# Patient Record
Sex: FEMALE | Race: WHITE | Employment: OTHER | URBAN - METROPOLITAN AREA
[De-identification: names, ages, dates, MRNs, and addresses within clinical notes are randomized per-mention and may not be internally consistent; named-entity substitution may affect disease eponyms.]

---

## 2017-10-29 ENCOUNTER — ED HISTORICAL/CONVERTED ENCOUNTER (OUTPATIENT)
Dept: OTHER | Age: 42
End: 2017-10-29

## 2018-02-03 ENCOUNTER — ED HISTORICAL/CONVERTED ENCOUNTER (OUTPATIENT)
Dept: OTHER | Age: 43
End: 2018-02-03

## 2018-12-15 ENCOUNTER — ED HISTORICAL/CONVERTED ENCOUNTER (OUTPATIENT)
Dept: OTHER | Age: 43
End: 2018-12-15

## 2019-05-18 ENCOUNTER — ED HISTORICAL/CONVERTED ENCOUNTER (OUTPATIENT)
Dept: OTHER | Age: 44
End: 2019-05-18

## 2019-05-23 ENCOUNTER — ED HISTORICAL/CONVERTED ENCOUNTER (OUTPATIENT)
Dept: OTHER | Age: 44
End: 2019-05-23

## 2020-01-24 ENCOUNTER — ED HISTORICAL/CONVERTED ENCOUNTER (OUTPATIENT)
Dept: OTHER | Age: 45
End: 2020-01-24

## 2020-10-21 ENCOUNTER — APPOINTMENT (OUTPATIENT)
Dept: GENERAL RADIOLOGY | Age: 45
End: 2020-10-21
Attending: EMERGENCY MEDICINE
Payer: MEDICARE

## 2020-10-21 ENCOUNTER — HOSPITAL ENCOUNTER (EMERGENCY)
Age: 45
Discharge: HOME | End: 2020-10-21
Attending: EMERGENCY MEDICINE
Payer: MEDICARE

## 2020-10-21 VITALS
WEIGHT: 265 LBS | SYSTOLIC BLOOD PRESSURE: 124 MMHG | TEMPERATURE: 98.2 F | HEART RATE: 107 BPM | DIASTOLIC BLOOD PRESSURE: 71 MMHG | RESPIRATION RATE: 20 BRPM | BODY MASS INDEX: 48.76 KG/M2 | OXYGEN SATURATION: 97 % | HEIGHT: 62 IN

## 2020-10-21 DIAGNOSIS — J04.0 LARYNGITIS: Primary | ICD-10-CM

## 2020-10-21 DIAGNOSIS — J20.9 ACUTE BRONCHITIS, UNSPECIFIED ORGANISM: ICD-10-CM

## 2020-10-21 PROCEDURE — 71045 X-RAY EXAM CHEST 1 VIEW: CPT

## 2020-10-21 PROCEDURE — 99283 EMERGENCY DEPT VISIT LOW MDM: CPT

## 2020-10-21 PROCEDURE — 74011636637 HC RX REV CODE- 636/637: Performed by: EMERGENCY MEDICINE

## 2020-10-21 RX ORDER — BUPROPION HYDROCHLORIDE 100 MG/1
100 TABLET ORAL 2 TIMES DAILY
COMMUNITY

## 2020-10-21 RX ORDER — INSULIN ASPART 100 [IU]/ML
8 INJECTION, SOLUTION INTRAVENOUS; SUBCUTANEOUS 2 TIMES DAILY
COMMUNITY
End: 2021-08-01

## 2020-10-21 RX ORDER — PREGABALIN 100 MG/1
100 CAPSULE ORAL 2 TIMES DAILY
COMMUNITY
End: 2021-11-23

## 2020-10-21 RX ORDER — AZITHROMYCIN 250 MG/1
TABLET, FILM COATED ORAL
Qty: 6 TAB | Refills: 0 | Status: SHIPPED | OUTPATIENT
Start: 2020-10-21 | End: 2021-05-27

## 2020-10-21 RX ORDER — PREDNISONE 20 MG/1
20 TABLET ORAL DAILY
Qty: 12 TAB | Refills: 0 | Status: SHIPPED | OUTPATIENT
Start: 2020-10-21 | End: 2020-10-27

## 2020-10-21 RX ORDER — PREDNISONE 20 MG/1
60 TABLET ORAL ONCE
Status: COMPLETED | OUTPATIENT
Start: 2020-10-21 | End: 2020-10-21

## 2020-10-21 RX ORDER — DEXLANSOPRAZOLE 30 MG/1
30 CAPSULE, DELAYED RELEASE ORAL DAILY
COMMUNITY

## 2020-10-21 RX ORDER — SPIRONOLACTONE 100 MG/1
100 TABLET, FILM COATED ORAL DAILY
COMMUNITY

## 2020-10-21 RX ORDER — INSULIN GLARGINE 100 [IU]/ML
40 INJECTION, SOLUTION SUBCUTANEOUS 2 TIMES DAILY
COMMUNITY
End: 2021-08-01

## 2020-10-21 RX ORDER — ROSUVASTATIN CALCIUM 40 MG/1
40 TABLET, COATED ORAL
COMMUNITY

## 2020-10-21 RX ORDER — LEVOTHYROXINE SODIUM 150 UG/1
150 TABLET ORAL
COMMUNITY
End: 2022-09-27 | Stop reason: SDUPTHER

## 2020-10-21 RX ADMIN — PREDNISONE 60 MG: 20 TABLET ORAL at 21:30

## 2020-10-22 NOTE — ED PROVIDER NOTES
EMERGENCY DEPARTMENT HISTORY AND PHYSICAL EXAM      Date: 10/21/2020  Patient Name: Kashmir Hancock    History of Presenting Illness     Chief Complaint   Patient presents with    Laryngitis    Cough       History Provided By: Patient    HPI: Kashmir Hancock, 50 y.o. female   presents to the ED with cc of hoarse voice. Patient complains of hoarse voice for last 3 days and intermittent nonproductive cough that started today. Patient has history of heavy smoking. No fever chills. No shortness of breath. No headache. No obvious exposure to COVID-19. PCP: Fior Najera MD    No current facility-administered medications on file prior to encounter. Current Outpatient Medications on File Prior to Encounter   Medication Sig Dispense Refill    buPROPion (WELLBUTRIN) 100 mg tablet Take 100 mg by mouth two (2) times a day.  levothyroxine (Synthroid) 150 mcg tablet Take 150 mcg by mouth Daily (before breakfast).  pregabalin (Lyrica) 100 mg capsule Take 100 mg by mouth two (2) times a day.  rosuvastatin (CRESTOR) 40 mg tablet Take 40 mg by mouth nightly.  spironolactone (ALDACTONE) 100 mg tablet Take 100 mg by mouth daily.  dexlansoprazole (Dexilant) 30 mg capsule Take 30 mg by mouth daily.  cariprazine (Vraylar) 1.5 mg capsule Take 1.5 mg by mouth daily.  insulin glargine (Lantus Solostar U-100 Insulin) 100 unit/mL (3 mL) inpn 40 Units by SubCUTAneous route two (2) times a day.  linagliptin (TRADJENTA PO) Take  by mouth.  insulin aspart U-100 (NovoLOG Flexpen U-100 Insulin) 100 unit/mL (3 mL) inpn 8 Units by SubCUTAneous route two (2) times a day.          Past History     Past Medical History:  Past Medical History:   Diagnosis Date    Asthma     Bipolar 1 disorder ([facility]     Carpal tunnel syndrome, bilateral     Chronic obstructive pulmonary disease (HCC)     Diabetes ([facility] [address]     Hypertension     Hypothyroidism        Past Surgical History:  Past Surgical History:   Procedure Laterality Date    HX CHOLECYSTECTOMY      HX OTHER SURGICAL      Uterine Ablation    HX TONSILLECTOMY      HX TUBAL LIGATION         Family History:  History reviewed. No pertinent family history. Social History:  Social History     Tobacco Use    Smoking status: Current Every Day Smoker     Packs/day: 0.25    Smokeless tobacco: Never Used   Substance Use Topics    Alcohol use: Not Currently    Drug use: Not Currently       Allergies: Allergies   Allergen Reactions    Latex Rash    Adhesive Rash    Aspirin Hives    Penicillins Hives         Review of Systems   Review of Systems   Constitutional: Negative for chills and fever. Respiratory: Negative for shortness of breath. Cardiovascular: Negative for chest pain. Gastrointestinal: Negative for nausea. Endocrine: Negative for polyuria. Genitourinary: Negative for dysuria. Skin: Negative for rash. Neurological: Negative for headaches. All other systems reviewed and are negative. Physical Exam   Physical Exam  Vitals signs and nursing note reviewed. Constitutional:       Appearance: Normal appearance. HENT:      Head: Normocephalic and atraumatic. Nose: Nose normal.      Mouth/Throat:      Mouth: Mucous membranes are moist.      Pharynx: Oropharynx is clear. Comments: Voice is hoarse without stridor. Eyes:      Conjunctiva/sclera: Conjunctivae normal.   Neck:      Musculoskeletal: Neck supple. Cardiovascular:      Rate and Rhythm: Normal rate and regular rhythm. Heart sounds: Normal heart sounds. Pulmonary:      Effort: Pulmonary effort is normal.      Breath sounds: Normal breath sounds. Comments: Few scattered rhonchi on forced exhalation. Abdominal:      General: Abdomen is flat. Bowel sounds are normal.      Palpations: Abdomen is soft. Musculoskeletal:      Right lower leg: No edema. Left lower leg: No edema. Skin:     General: Skin is warm and dry. Neurological:      General: No focal deficit present. Mental Status: She is alert and oriented to person, place, and time. Psychiatric:         Mood and Affect: Mood normal.         Diagnostic Study Results     Labs -   No results found for this or any previous visit (from the past 12 hour(s)). Radiologic Studies -   XR CHEST PORT    (Results Pending)     CT Results  (Last 48 hours)    None        CXR Results  (Last 48 hours)    None            Medical Decision Making   I am the first provider for this patient. I reviewed the vital signs, available nursing notes, past medical history, past surgical history, family history and social history. Vital Signs-Reviewed the patient's vital signs. Patient Vitals for the past 12 hrs:   Temp Pulse Resp BP SpO2   10/21/20 2058 98.2 °F (36.8 °C) (!) 107 20 124/71 97 %       Records Reviewed:     Provider Notes (Medical Decision Making):       ED Course:   Initial assessment performed. The patients presenting problems have been discussed, and they are in agreement with the care plan formulated and outlined with them. I have encouraged them to ask questions as they arise throughout their visit. PROCEDURES        PLAN:  1. Current Discharge Medication List      START taking these medications    Details   predniSONE (DELTASONE) 20 mg tablet Take 20 mg by mouth daily for 6 days. 3 tablets for 2 days then 2 tablets for 2 days then 1 tablet for 2 day with breakfast  Qty: 12 Tab, Refills: 0      azithromycin (Zithromax Z-Rufino) 250 mg tablet As instructed in the pack  Qty: 6 Tab, Refills: 0           2. Follow-up Information     Follow up With Specialties Details Why Contact Info    Follow up with your primary care physician  Schedule an appointment as soon as possible for a visit in 3 days As needed         Return to ED if worse     Diagnosis     Clinical Impression:   1. Laryngitis    2.  Acute bronchitis, unspecified organism

## 2020-10-22 NOTE — ED TRIAGE NOTES
Laryngitis x 2.5 weeks, now having \"choking/coughing spells\" ooset today where she becomes dizzy and near syncope during coughing

## 2020-12-25 ENCOUNTER — HOSPITAL ENCOUNTER (EMERGENCY)
Age: 45
Discharge: HOME | End: 2020-12-25
Attending: FAMILY MEDICINE
Payer: MEDICARE

## 2020-12-25 VITALS
SYSTOLIC BLOOD PRESSURE: 144 MMHG | OXYGEN SATURATION: 20 % | TEMPERATURE: 98.3 F | HEART RATE: 91 BPM | DIASTOLIC BLOOD PRESSURE: 74 MMHG | WEIGHT: 283 LBS | RESPIRATION RATE: 16 BRPM | HEIGHT: 62 IN | BODY MASS INDEX: 52.08 KG/M2

## 2020-12-25 DIAGNOSIS — R42 DIZZINESS: Primary | ICD-10-CM

## 2020-12-25 DIAGNOSIS — R73.9 HYPERGLYCEMIA: ICD-10-CM

## 2020-12-25 LAB
ALBUMIN SERPL-MCNC: 3.1 G/DL (ref 3.5–5)
ALBUMIN/GLOB SERPL: 0.9 {RATIO} (ref 1.1–2.2)
ALP SERPL-CCNC: 119 U/L (ref 45–117)
ALT SERPL-CCNC: 23 U/L (ref 12–78)
ANION GAP SERPL CALC-SCNC: 9 MMOL/L (ref 5–15)
AST SERPL W P-5'-P-CCNC: 8 U/L (ref 15–37)
BASOPHILS # BLD: 0 K/UL (ref 0–0.1)
BASOPHILS NFR BLD: 0 % (ref 0–1)
BILIRUB SERPL-MCNC: 0.3 MG/DL (ref 0.2–1)
BUN SERPL-MCNC: 14 MG/DL (ref 6–20)
BUN/CREAT SERPL: 17 (ref 12–20)
CA-I BLD-MCNC: 8.3 MG/DL (ref 8.5–10.1)
CHLORIDE SERPL-SCNC: 108 MMOL/L (ref 97–108)
CO2 SERPL-SCNC: 26 MMOL/L (ref 21–32)
CREAT SERPL-MCNC: 0.84 MG/DL (ref 0.55–1.02)
DIFFERENTIAL METHOD BLD: NORMAL
EOSINOPHIL # BLD: 0.1 K/UL (ref 0–0.4)
EOSINOPHIL NFR BLD: 1 % (ref 0–7)
ERYTHROCYTE [DISTWIDTH] IN BLOOD BY AUTOMATED COUNT: 13 % (ref 11.5–14.5)
GLOBULIN SER CALC-MCNC: 3.5 G/DL (ref 2–4)
GLUCOSE BLD STRIP.AUTO-MCNC: 283 MG/DL (ref 65–100)
GLUCOSE SERPL-MCNC: 262 MG/DL (ref 65–100)
HCT VFR BLD AUTO: 41.9 % (ref 35–47)
HGB BLD-MCNC: 13.8 G/DL (ref 11.5–16)
IMM GRANULOCYTES # BLD AUTO: 0 K/UL (ref 0–0.04)
IMM GRANULOCYTES NFR BLD AUTO: 0 % (ref 0–0.5)
LYMPHOCYTES # BLD: 3.3 K/UL (ref 0.8–3.5)
LYMPHOCYTES NFR BLD: 31 % (ref 12–49)
MCH RBC QN AUTO: 31.4 PG (ref 26–34)
MCHC RBC AUTO-ENTMCNC: 32.9 G/DL (ref 30–36.5)
MCV RBC AUTO: 95.4 FL (ref 80–99)
MONOCYTES # BLD: 0.6 K/UL (ref 0–1)
MONOCYTES NFR BLD: 5 % (ref 5–13)
NEUTS SEG # BLD: 6.8 K/UL (ref 1.8–8)
NEUTS SEG NFR BLD: 63 % (ref 32–75)
PERFORMED BY, TECHID: ABNORMAL
PLATELET # BLD AUTO: 243 K/UL (ref 150–400)
PMV BLD AUTO: 9.6 FL (ref 8.9–12.9)
POTASSIUM SERPL-SCNC: 3.7 MMOL/L (ref 3.5–5.1)
PROT SERPL-MCNC: 6.6 G/DL (ref 6.4–8.2)
RBC # BLD AUTO: 4.39 M/UL (ref 3.8–5.2)
SODIUM SERPL-SCNC: 143 MMOL/L (ref 136–145)
WBC # BLD AUTO: 10.9 K/UL (ref 3.6–11)

## 2020-12-25 PROCEDURE — 80053 COMPREHEN METABOLIC PANEL: CPT

## 2020-12-25 PROCEDURE — 99284 EMERGENCY DEPT VISIT MOD MDM: CPT

## 2020-12-25 PROCEDURE — 36415 COLL VENOUS BLD VENIPUNCTURE: CPT

## 2020-12-25 PROCEDURE — 85025 COMPLETE CBC W/AUTO DIFF WBC: CPT

## 2020-12-25 PROCEDURE — 82962 GLUCOSE BLOOD TEST: CPT

## 2020-12-26 NOTE — ED PROVIDER NOTES
[facility] EMERGENCY CARE CENTER    HISTORY AND PHYSICAL EXAM      Date: 12/25/2020  Patient Name: Elizabet Condon  YOB: 1975  MRN: [id number]  Room:  [place] [address]    History of Presenting Illness     Chief Complaint:  Dizziness     History Provided By: Patient  HPI/KATIE Limits: None    HPI: Magen Teague is a 50 y.o. female presenting to the ED with CC of dizziness with initial onset earlier today. Patient states that today she had 3 episodes of transient dizziness not lasting more than 2 minutes at a time. Patient unaware of what the triggers were. Patient states these episodes occur 10-12 times per month and has been occurring for the last 9 years. Patient states that she has not talked to any of her previous PCPs about this problem. Patient is also concerned about hyperglycemia secondary to steroid use. Patient states that earlier her glucose was 360 and she has excessive thirst.  Currently, patient denies F/C, headache, visual changes, N/V/D/C, abdominal pain, or  complaints. There are no other complaints, changes, or physical findings at this time. PCP: Shelby Georges MD    No current facility-administered medications on file prior to encounter. Current Outpatient Medications on File Prior to Encounter   Medication Sig Dispense Refill    buPROPion (WELLBUTRIN) 100 mg tablet Take 100 mg by mouth two (2) times a day.  levothyroxine (Synthroid) 150 mcg tablet Take 150 mcg by mouth Daily (before breakfast).  pregabalin (Lyrica) 100 mg capsule Take 100 mg by mouth two (2) times a day.  rosuvastatin (CRESTOR) 40 mg tablet Take 40 mg by mouth nightly.  spironolactone (ALDACTONE) 100 mg tablet Take 100 mg by mouth daily.  dexlansoprazole (Dexilant) 30 mg capsule Take 30 mg by mouth daily.  cariprazine (Vraylar) 1.5 mg capsule Take 1.5 mg by mouth daily.       insulin glargine (Lantus Solostar U-100 Insulin) 100 unit/mL (3 mL) inpn 40 Units by SubCUTAneous route two (2) times a day.  linagliptin (TRADJENTA PO) Take  by mouth.  insulin aspart U-100 (NovoLOG Flexpen U-100 Insulin) 100 unit/mL (3 mL) inpn 8 Units by SubCUTAneous route two (2) times a day.  azithromycin (Zithromax Z-Rufino) 250 mg tablet As instructed in the pack 6 Tab 0       Past History     PAST MEDICAL  The patient  has a past medical history of Asthma, Bipolar 1 disorder ([address] Carpal tunnel syndrome, bilateral, Chronic obstructive pulmonary disease ([address] Diabetes ([address] Hypertension, and Hypothyroidism. PAST SURGICAL  The patient  has a past surgical history that includes hx tonsillectomy; hx tubal ligation; hx cholecystectomy; and hx other surgical.    SOCIAL HISTORY  The patient  reports that she has been smoking. She has been smoking about 0.25 packs per day. She has never used smokeless tobacco. She reports previous alcohol use. She reports previous drug use. Allergies: The patient is allergic to latex; adhesive; aspirin; and penicillins. Review of Systems     Review of Systems   Constitutional: Negative. HENT: Negative. Eyes: Negative. Negative for visual disturbance. Respiratory: Negative for shortness of breath. Cardiovascular: Negative for chest pain. Gastrointestinal: Negative for abdominal pain. Endocrine: Negative. Genitourinary: Negative for dysuria, flank pain, frequency, urgency, vaginal bleeding and vaginal discharge. Musculoskeletal: Negative for back pain and neck pain. Skin: Negative. Allergic/Immunologic: Negative. Neurological: Positive for dizziness. Hematological: Negative. Psychiatric/Behavioral: Negative. Physical Exam     Vital Signs-Reviewed the patient's vital signs. Patient Vitals for the past 24 hrs:   Temp Pulse Resp BP SpO2   12/25/20 2127 98.3 °F (36.8 °C) 91 16 (!) 144/74 (!) 20 %      Physical Exam  Vitals signs and nursing note reviewed.    Constitutional:       Appearance: Normal appearance. She is well-developed and well-groomed. She is morbidly obese. HENT:      Head: Normocephalic and atraumatic. Mouth/Throat:      Mouth: Mucous membranes are moist.      Pharynx: Oropharynx is clear. Eyes:      Extraocular Movements: Extraocular movements intact. Right eye: No nystagmus. Left eye: No nystagmus. Conjunctiva/sclera: Conjunctivae normal.      Pupils: Pupils are equal, round, and reactive to light. Neck:      Musculoskeletal: Normal range of motion and neck supple. Cardiovascular:      Rate and Rhythm: Normal rate and regular rhythm. Pulmonary:      Effort: Pulmonary effort is normal.      Breath sounds: Normal breath sounds. Abdominal:      General: Abdomen is flat. Palpations: Abdomen is soft. Musculoskeletal: Normal range of motion. Skin:     General: Skin is warm and dry. Neurological:      General: No focal deficit present. Mental Status: She is alert and oriented to person, place, and time. Cranial Nerves: Cranial nerves are intact. Motor: Motor function is intact. Coordination: Coordination is intact. Gait: Gait is intact.    Psychiatric:         Mood and Affect: Mood normal.         Behavior: Behavior normal.       Diagnostic Study Results     Labs -     Recent Results (from the past 12 hour(s))   GLUCOSE, POC    Collection Time: 12/25/20  9:32 PM   Result Value Ref Range    Glucose (POC) 283 (H) 65 - 100 mg/dL    Performed by Jason Stoner    CBC WITH AUTOMATED DIFF    Collection Time: 12/25/20 10:00 PM   Result Value Ref Range    WBC 10.9 3.6 - 11.0 K/uL    RBC 4.39 3.80 - 5.20 M/uL    HGB 13.8 11.5 - 16.0 g/dL    HCT 41.9 35.0 - 47.0 %    MCV 95.4 80.0 - 99.0 FL    MCH 31.4 26.0 - 34.0 PG    MCHC 32.9 30.0 - 36.5 g/dL    RDW 13.0 11.5 - 14.5 %    PLATELET 317 741 - 499 K/uL    MPV 9.6 8.9 - 12.9 FL    NEUTROPHILS 63 32 - 75 %    LYMPHOCYTES 31 12 - 49 %    MONOCYTES 5 5 - 13 %    EOSINOPHILS 1 0 - 7 %    BASOPHILS 0 0 - 1 %    IMMATURE GRANULOCYTES 0 0.0 - 0.5 %    ABS. NEUTROPHILS 6.8 1.8 - 8.0 K/UL    ABS. LYMPHOCYTES 3.3 0.8 - 3.5 K/UL    ABS. MONOCYTES 0.6 0.0 - 1.0 K/UL    ABS. EOSINOPHILS 0.1 0.0 - 0.4 K/UL    ABS. BASOPHILS 0.0 0.0 - 0.1 K/UL    ABS. IMM. GRANS. 0.0 0.00 - 0.04 K/UL    DF AUTOMATED     METABOLIC PANEL, COMPREHENSIVE    Collection Time: 12/25/20 10:00 PM   Result Value Ref Range    Sodium 143 136 - 145 mmol/L    Potassium 3.7 3.5 - 5.1 mmol/L    Chloride 108 97 - 108 mmol/L    CO2 26 21 - 32 mmol/L    Anion gap 9 5 - 15 mmol/L    Glucose 262 (H) 65 - 100 mg/dL    BUN 14 6 - 20 mg/dL    Creatinine 0.84 0.55 - 1.02 mg/dL    BUN/Creatinine ratio 17 12 - 20      GFR est AA >60 >60 ml/min/1.73m2    GFR est non-AA >60 >60 ml/min/1.73m2    Calcium 8.3 (L) 8.5 - 10.1 mg/dL    Bilirubin, total 0.3 0.2 - 1.0 mg/dL    AST (SGOT) 8 (L) 15 - 37 U/L    ALT (SGPT) 23 12 - 78 U/L    Alk. phosphatase 119 (H) 45 - 117 U/L    Protein, total 6.6 6.4 - 8.2 g/dL    Albumin 3.1 (L) 3.5 - 5.0 g/dL    Globulin 3.5 2.0 - 4.0 g/dL    A-G Ratio 0.9 (L) 1.1 - 2.2       ECG interpretation: (Preliminary)  RHYTHM: normal sinus rhythm  RATE: regular . Rate (approx.): 81;   AXIS: normal  OH INTERVAL: normal  QRS INTERVAL: normal   ST/T WAVE: normal  EKG FINDINGS: normal EKG, normal sinus rhythm. Radiologic Studies -   No orders to display     CT Results  (Last 48 hours)    None        CXR Results  (Last 48 hours)    None        The results of the diagnostic studies, performed during the time frame I've seen and evaluated the patient, have been REVIEWED BY MYSELF. Procedures/Critical Care     PROCEDURES  None    Medical Decision Making   I am the first provider for this patient. I reviewed the vital signs, available nursing notes, past medical history, past surgical history, family history and social history. Records Reviewed: Nursing Notes    ED Course:   Initial assessment performed.  The patients presenting problems have been discussed, and they are in agreement with the care plan formulated and outlined with them. I have encouraged them to ask questions as they arise throughout their visit. Medications - No data to display    Provider Notes (Medical Decision Making): The patient presented to the emergency department with complaint of transient dizziness. There are no red flag symptoms such as diplopia, dysmetria, dysarthria, ataxia or unilateral numbness or weakness. Evaluation of the patient is significant for a nonfocal exam.  Diagnostic (laboratory/radiographic) evaluation is negative for acute findings except hyperglycemia. Symptoms are not suggestive dehydration, orthostatic hypotension, arrhythmia, electrolyte disturbance, ACS, BPPV, labyrinthitis, otitis media, medical toxicity or other serious etiology. These diagnoses have been considered and excluded clinically. Given the low risk of these diagnoses further testing and evaluation does not appear to be indicated at this time. If ordered, results of lab/radiology tests were reviewed and discussed with the patient and/or family. Diagnostic impression and plan were discussed and agreed upon with the patient and/or family. Patient advised to continue insulin sliding scale as dictated by PCP. The patient has received maximum benefit from this visit and felt eligible for discharge. Dizziness & hyperglycemia precautions were given. All questions were answered and concerns addressed. The patient was advised to follow-up with the patient's primary care physician, and that should the symptoms worsen or change in anyway that they are to return to the ER immediately for re-evaluation. Patient discharged in stable condition. Diagnosis/Plan/Follow Up     CLINICAL IMPRESSION:      ICD-10-CM ICD-9-CM   1. Dizziness  R42 780.4   2. Hyperglycemia  R73.9 790.29        DISPOSITION:  Discharged to Home or Self Care in stable condition. PLAN/FOLLOW UP  1.    Discharge Medication List as of 12/25/2020 11:16 PM        2. The patient's results have been reviewed with them. The patient has been counseled regarding their diagnosis. The patient verbally conveys understanding and agreement of the signs, symptoms, diagnosis, treatment and prognosis and additionally agrees to follow up as recommended with their PCP. The patient also agrees with the care-plan and conveys that all of their questions have been answered. I have also put together some discharge instructions for them that include: 1) educational information regarding their diagnosis, 2) how to care for their diagnosis at home, as well as a 3) list of reasons why they would want to return to the ED prior to their follow-up appointment, should their condition change. Follow-up Information     Follow up With Specialties Details Why Contact Info    Denton Curran MD Family Medicine Schedule an appointment as soon as possible for a visit in 3 days  [address] [address]      [address] [facility] Emergency Medicine Go to  If symptoms worsen [address]  [phone]        Return to ED if worse       PAWEL Rojas M.D.   [facility]  Emergency Department Physician

## 2021-05-27 ENCOUNTER — APPOINTMENT (OUTPATIENT)
Dept: CT IMAGING | Age: 46
End: 2021-05-27
Attending: EMERGENCY MEDICINE
Payer: MEDICARE

## 2021-05-27 ENCOUNTER — HOSPITAL ENCOUNTER (EMERGENCY)
Age: 46
Discharge: HOME | End: 2021-05-28
Attending: EMERGENCY MEDICINE
Payer: MEDICARE

## 2021-05-27 VITALS
RESPIRATION RATE: 18 BRPM | DIASTOLIC BLOOD PRESSURE: 79 MMHG | TEMPERATURE: 98.4 F | HEART RATE: 94 BPM | WEIGHT: 285 LBS | BODY MASS INDEX: 52.44 KG/M2 | HEIGHT: 62 IN | OXYGEN SATURATION: 96 % | SYSTOLIC BLOOD PRESSURE: 121 MMHG

## 2021-05-27 DIAGNOSIS — N20.0 RENAL CALCULI: Primary | ICD-10-CM

## 2021-05-27 LAB
APPEARANCE UR: ABNORMAL
BACTERIA URNS QL MICRO: ABNORMAL /HPF
BASOPHILS # BLD: 0 K/UL (ref 0–0.1)
BASOPHILS NFR BLD: 0 % (ref 0–1)
BILIRUB UR QL: NEGATIVE
COLOR UR: YELLOW
DIFFERENTIAL METHOD BLD: ABNORMAL
EOSINOPHIL # BLD: 0.1 K/UL (ref 0–0.4)
EOSINOPHIL NFR BLD: 0 % (ref 0–7)
EPITH CASTS URNS QL MICRO: ABNORMAL /LPF
ERYTHROCYTE [DISTWIDTH] IN BLOOD BY AUTOMATED COUNT: 14.1 % (ref 11.5–14.5)
GLUCOSE UR STRIP.AUTO-MCNC: 100 MG/DL
HCG UR QL: NEGATIVE
HCT VFR BLD AUTO: 44.9 % (ref 35–47)
HGB BLD-MCNC: 14.8 G/DL (ref 11.5–16)
HGB UR QL STRIP: NEGATIVE
KETONES UR QL STRIP.AUTO: NEGATIVE MG/DL
LEUKOCYTE ESTERASE UR QL STRIP.AUTO: NEGATIVE
LYMPHOCYTES NFR BLD: 33 % (ref 12–49)
MCH RBC QN AUTO: 31 PG (ref 26–34)
MCHC RBC AUTO-ENTMCNC: 33 G/DL (ref 30–36.5)
MCV RBC AUTO: 94.1 FL (ref 80–99)
MONOCYTES NFR BLD: 2 % (ref 5–13)
NEUTS SEG NFR BLD: 65 % (ref 32–75)
NITRITE UR QL STRIP.AUTO: NEGATIVE
PH UR STRIP: 5 [PH] (ref 5–8)
PLATELET # BLD AUTO: 288 K/UL (ref 150–400)
PMV BLD AUTO: 9.3 FL (ref 8.9–12.9)
PROT UR STRIP-MCNC: NEGATIVE MG/DL
RBC # BLD AUTO: 4.77 M/UL (ref 3.8–5.2)
RBC #/AREA URNS HPF: ABNORMAL /HPF (ref 0–5)
SP GR UR REFRACTOMETRY: 1.02 (ref 1–1.03)
UROBILINOGEN UR QL STRIP.AUTO: 0.1 EU/DL (ref 0.2–1)
WBC # BLD AUTO: 13 K/UL (ref 3.6–11)
WBC URNS QL MICRO: ABNORMAL /HPF (ref 0–4)

## 2021-05-27 PROCEDURE — 99283 EMERGENCY DEPT VISIT LOW MDM: CPT

## 2021-05-27 PROCEDURE — 74176 CT ABD & PELVIS W/O CONTRAST: CPT

## 2021-05-27 PROCEDURE — 96374 THER/PROPH/DIAG INJ IV PUSH: CPT

## 2021-05-27 PROCEDURE — 81003 URINALYSIS AUTO W/O SCOPE: CPT

## 2021-05-27 PROCEDURE — 74011250636 HC RX REV CODE- 250/636: Performed by: EMERGENCY MEDICINE

## 2021-05-27 PROCEDURE — 36415 COLL VENOUS BLD VENIPUNCTURE: CPT

## 2021-05-27 PROCEDURE — 85025 COMPLETE CBC W/AUTO DIFF WBC: CPT

## 2021-05-27 PROCEDURE — 81025 URINE PREGNANCY TEST: CPT

## 2021-05-27 RX ORDER — KETOROLAC TROMETHAMINE 30 MG/ML
15 INJECTION, SOLUTION INTRAMUSCULAR; INTRAVENOUS
Status: COMPLETED | OUTPATIENT
Start: 2021-05-27 | End: 2021-05-27

## 2021-05-27 RX ADMIN — KETOROLAC TROMETHAMINE 15 MG: 30 INJECTION, SOLUTION INTRAMUSCULAR; INTRAVENOUS at 23:05

## 2021-05-28 PROCEDURE — 74011250637 HC RX REV CODE- 250/637: Performed by: EMERGENCY MEDICINE

## 2021-05-28 RX ORDER — HYDROCODONE BITARTRATE AND ACETAMINOPHEN 5; 325 MG/1; MG/1
1 TABLET ORAL
Qty: 10 TABLET | Refills: 0 | Status: SHIPPED | OUTPATIENT
Start: 2021-05-28 | End: 2021-05-31

## 2021-05-28 RX ORDER — HYDROCODONE BITARTRATE AND ACETAMINOPHEN 5; 325 MG/1; MG/1
1 TABLET ORAL
Status: COMPLETED | OUTPATIENT
Start: 2021-05-28 | End: 2021-05-28

## 2021-05-28 RX ADMIN — HYDROCODONE BITARTRATE AND ACETAMINOPHEN 1 TABLET: 5; 325 TABLET ORAL at 00:46

## 2021-05-28 NOTE — ED PROVIDER NOTES
EMERGENCY DEPARTMENT HISTORY AND PHYSICAL EXAM      Date: 5/27/2021  Patient Name: Richa Cid    History of Presenting Illness     Chief Complaint   Patient presents with    Hip Pain    Back Pain       History Provided By: Patient    HPI: Richa Cid, 50 y.o. female with a past medical history significant diabetes, asthma and hypothyroid presents to the ED with cc of right hip/abdominal pain for 2 weeks, unrelieved by Tylenol or ibuprofen. No known injury. No fever, cough, n/v/d/c or urinary sx. She has h/o renal stones and cholecystectomy. PCP: Candida Phan MD    No current facility-administered medications on file prior to encounter. Current Outpatient Medications on File Prior to Encounter   Medication Sig Dispense Refill    buPROPion (WELLBUTRIN) 100 mg tablet Take 100 mg by mouth two (2) times a day.  levothyroxine (Synthroid) 150 mcg tablet Take 150 mcg by mouth Daily (before breakfast).  pregabalin (Lyrica) 100 mg capsule Take 100 mg by mouth two (2) times a day.  rosuvastatin (CRESTOR) 40 mg tablet Take 40 mg by mouth nightly.  spironolactone (ALDACTONE) 100 mg tablet Take 100 mg by mouth daily.  dexlansoprazole (Dexilant) 30 mg capsule Take 30 mg by mouth daily.  cariprazine (Vraylar) 1.5 mg capsule Take 1.5 mg by mouth daily.  insulin glargine (Lantus Solostar U-100 Insulin) 100 unit/mL (3 mL) inpn 40 Units by SubCUTAneous route two (2) times a day.  linagliptin (TRADJENTA PO) Take  by mouth.  insulin aspart U-100 (NovoLOG Flexpen U-100 Insulin) 100 unit/mL (3 mL) inpn 8 Units by SubCUTAneous route two (2) times a day.          Past History     Past Medical History:  Past Medical History:   Diagnosis Date    Asthma     [facility] disorder ([facility]     Carpal tunnel syndrome, bilateral     Chronic obstructive pulmonary disease (HCC)     Diabetes ([facility] [address]     Hypothyroidism        Past Surgical History:  Past Surgical History: Procedure Laterality Date    HX CHOLECYSTECTOMY      HX OTHER SURGICAL      Uterine Ablation    HX TONSILLECTOMY      HX TUBAL LIGATION         Family History:  History reviewed. No pertinent family history. Social History:  Social History     Tobacco Use    Smoking status: Current Every Day Smoker     Packs/day: 0.25    Smokeless tobacco: Never Used   Substance Use Topics    Alcohol use: Not Currently    Drug use: Not Currently       Allergies: Allergies   Allergen Reactions    Latex Rash    Adhesive Rash    Aspirin Hives    Levaquin [Levofloxacin] Other (comments)     \"my arm got really hot and broke out really red the last time I had the IV version\"    Penicillins Hives         Review of Systems   Review of Systems   Constitutional: Negative for fever. Respiratory: Negative for cough and shortness of breath. Cardiovascular: Negative for chest pain. Gastrointestinal: Negative for constipation, diarrhea and vomiting. Skin: Negative for rash. All other systems reviewed and are negative. Physical Exam   Physical Exam  Vitals and nursing note reviewed. Constitutional:       Appearance: Normal appearance. She is obese. She is not toxic-appearing. HENT:      Head: Normocephalic and atraumatic. Mouth/Throat:      Mouth: Mucous membranes are moist.      Pharynx: No oropharyngeal exudate or posterior oropharyngeal erythema. Eyes:      General: No scleral icterus. Pupils: Pupils are equal, round, and reactive to light. Cardiovascular:      Rate and Rhythm: Normal rate and regular rhythm. Pulses: Normal pulses. Heart sounds: Normal heart sounds. Pulmonary:      Effort: Pulmonary effort is normal.      Breath sounds: Normal breath sounds. No wheezing, rhonchi or rales. Abdominal:      General: Bowel sounds are normal.      Palpations: Abdomen is soft. Tenderness: There is abdominal tenderness. There is no right CVA tenderness or left CVA tenderness. Comments: RLQ  TTP with guarding. Musculoskeletal:         General: Normal range of motion. Cervical back: Normal range of motion. Right lower leg: No edema. Left lower leg: No edema. Skin:     General: Skin is warm and dry. Findings: No rash. Comments: Hirsutism     Neurological:      General: No focal deficit present. Mental Status: She is alert and oriented to person, place, and time. Comments: Nl gross motor/sensory exam without any focal or lateralizing deficits   Psychiatric:         Mood and Affect: Mood normal.         Behavior: Behavior normal.         Diagnostic Study Results     Labs -     Recent Results (from the past 12 hour(s))   CBC WITH AUTOMATED DIFF    Collection Time: 05/27/21 10:45 PM   Result Value Ref Range    WBC 13.0 (H) 3.6 - 11.0 K/uL    RBC 4.77 3.80 - 5.20 M/uL    HGB 14.8 11.5 - 16.0 g/dL    HCT 44.9 35.0 - 47.0 %    MCV 94.1 80.0 - 99.0 FL    MCH 31.0 26.0 - 34.0 PG    MCHC 33.0 30.0 - 36.5 g/dL    RDW 14.1 11.5 - 14.5 %    PLATELET 299 688 - 491 K/uL    MPV 9.3 8.9 - 12.9 FL    NEUTROPHILS 65 32 - 75 %    LYMPHOCYTES 33 12 - 49 %    MONOCYTES 2 (L) 5 - 13 %    EOSINOPHILS 0 0 - 7 %    BASOPHILS 0 0 - 1 %    ABS. EOSINOPHILS 0.1 0.0 - 0.4 K/UL    ABS.  BASOPHILS 0.0 0.0 - 0.1 K/UL    DF AUTOMATED     URINALYSIS W/ RFLX MICROSCOPIC    Collection Time: 05/27/21 10:45 PM   Result Value Ref Range    Color Yellow      Appearance Hazy (A) Clear      Specific gravity 1.020 1.003 - 1.030      pH (UA) 5.0 5.0 - 8.0      Protein Negative Negative mg/dL    Glucose 100 (A) Negative mg/dL    Ketone Negative Negative mg/dL    Bilirubin Negative Negative      Blood Negative Negative      Urobilinogen 0.1 (L) 0.2 - 1.0 EU/dL    Nitrites Negative Negative      Leukocyte Esterase Negative Negative      WBC 0-4 0 - 4 /hpf    RBC 0-5 0 - 5 /hpf    Epithelial cells Few Few /lpf    Bacteria 2+ (A) Negative /hpf   HCG URINE, QL    Collection Time: 05/27/21 10:45 PM Result Value Ref Range    HCG urine, QL Negative Negative         Radiologic Studies -   CT ABD PELV WO CONT   Final Result   No acute abdominopelvic abnormality. Bilateral nonobstructing renal   calculi. Diverticulosis without diverticulitis. CT Results  (Last 48 hours)               05/27/21 2349  CT ABD PELV WO CONT Final result    Impression:  No acute abdominopelvic abnormality. Bilateral nonobstructing renal   calculi. Diverticulosis without diverticulitis. Narrative:  HISTORY:   RLQ abdominal pain     Dose reduction technique: All CT scans at this facility are performed using dose reduction optimization   technique as appropriate on the exam including the following: Automated exposure   control, adjustment of the MA and/or KV according to patient size and/or use of   iterative reconstructive technique. TECHNIQUE: CT of the abdomen and pelvis without contrast   COMPARISON: None   LIMITATIONS: None       CHEST: No acute airspace process or pleural effusion seen at the lung bases. LIVER: Normal.        GALLBLADDER: Cholecystectomy. BILIARY TREE: Normal.           PANCREAS: Normal.            SPLEEN: Normal.           ADRENAL GLANDS: Normal.       KIDNEYS/URETERS/BLADDER: Negative for acute abnormality. Numerous bilateral   nonobstructing renal calculi are present and all measure less than 5 mm. Ureters and urinary bladder appear unremarkable. RETROPERITONEUM/AORTA: Normal.      BOWEL/MESENTERY: Diffuse diverticulosis of the distal colon without evidence of   acute diverticulitis. APPENDIX: Identified and normal.         PERITONEAL CAVITY: Normal.          REPRODUCTIVE ORGANS: Normal.           BONE/TISSUES: No acute abnormality. OTHER: None. CXR Results  (Last 48 hours)    None            Medical Decision Making   I am the first provider for this patient.     I reviewed the vital signs, available nursing notes, past medical history, past surgical history, family history and social history. Vital Signs-Reviewed the patient's vital signs. Patient Vitals for the past 12 hrs:   Temp Pulse Resp BP SpO2   05/27/21 2201 98.4 °F (36.9 °C) 94 18 121/79 96 %       Records Reviewed: Nursing Notes and Old Medical Records ED visit 12/25/2020    Provider Notes (Medical Decision Making): The patient presents with abdominal pain with a differential diagnosis of abdominal pain, appendicitis, obstruction, renal Colic, UTI and MSK pain      ED Course:   Initial assessment performed. The patients presenting problems have been discussed, and they are in agreement with the care plan formulated and outlined with them. I have encouraged them to ask questions as they arise throughout their visit. Pt given IV Toradol with some improvement in her pain. Later given HC tablet for continued pain. Labs above, significant for WBC 13K. CT obtained with findings above. Appendix noted to be normal but multiple renal calculi may be source of pain. Pt and spouse advised of findings, she will schedule f/u with Dr. Ivy Garcia of Urology, who she has followed with for many years. Rx for HC 5mg #10. Discussed s/sx for which to return. PLAN:  1. Discharge Medication List as of 5/28/2021 12:41 AM      START taking these medications    Details   HYDROcodone-acetaminophen (Norco) 5-325 mg per tablet Take 1 Tablet by mouth every six (6) hours as needed for Pain for up to 3 days. Max Daily Amount: 4 Tablets., Normal, Disp-10 Tablet, R-0         CONTINUE these medications which have NOT CHANGED    Details   buPROPion (WELLBUTRIN) 100 mg tablet Take 100 mg by mouth two (2) times a day., Historical Med      levothyroxine (Synthroid) 150 mcg tablet Take 150 mcg by mouth Daily (before breakfast). , Historical Med      pregabalin (Lyrica) 100 mg capsule Take 100 mg by mouth two (2) times a day., Historical Med      rosuvastatin (CRESTOR) 40 mg tablet Take 40 mg by mouth nightly., Historical Med      spironolactone (ALDACTONE) 100 mg tablet Take 100 mg by mouth daily. , Historical Med      dexlansoprazole (Dexilant) 30 mg capsule Take 30 mg by mouth daily. , Historical Med      cariprazine (Vraylar) 1.5 mg capsule Take 1.5 mg by mouth daily. , Historical Med      insulin glargine (Lantus Solostar U-100 Insulin) 100 unit/mL (3 mL) inpn 40 Units by SubCUTAneous route two (2) times a day., Historical Med      linagliptin (TRADJENTA PO) Take  by mouth., Historical Med      insulin aspart U-100 (NovoLOG Flexpen U-100 Insulin) 100 unit/mL (3 mL) inpn 8 Units by SubCUTAneous route two (2) times a day., Historical Med           2. Follow-up Information     Follow up With Specialties Details Why Contact Info    Linda Servin MD Urology   [address] [facility]  [phone]      Molina Wall MD Family Medicine   [address] [address]  [address] [place] [address]  [phone]          Return to ED if worse     Diagnosis     Clinical Impression:   1.  Renal calculi

## 2021-05-28 NOTE — ED TRIAGE NOTES
Patient reports right hip pain times 2 weeks that has now radiated to lower back.  Denies other symptoms

## 2021-06-24 ENCOUNTER — HOSPITAL ENCOUNTER (EMERGENCY)
Age: 46
Discharge: HOME | End: 2021-06-24
Attending: EMERGENCY MEDICINE
Payer: MEDICARE

## 2021-06-24 VITALS
BODY MASS INDEX: 51.71 KG/M2 | WEIGHT: 281 LBS | HEART RATE: 94 BPM | RESPIRATION RATE: 20 BRPM | TEMPERATURE: 97.8 F | OXYGEN SATURATION: 97 % | HEIGHT: 62 IN | DIASTOLIC BLOOD PRESSURE: 103 MMHG | SYSTOLIC BLOOD PRESSURE: 125 MMHG

## 2021-06-24 DIAGNOSIS — K12.2 UVULITIS: Primary | ICD-10-CM

## 2021-06-24 PROCEDURE — 99282 EMERGENCY DEPT VISIT SF MDM: CPT

## 2021-06-24 RX ORDER — PREDNISONE 20 MG/1
TABLET ORAL
Qty: 12 TABLET | Refills: 0 | Status: SHIPPED | OUTPATIENT
Start: 2021-06-24 | End: 2021-08-01

## 2021-06-24 RX ORDER — AZITHROMYCIN 250 MG/1
TABLET, FILM COATED ORAL
Qty: 6 TABLET | Refills: 0 | Status: SHIPPED | OUTPATIENT
Start: 2021-06-24 | End: 2021-08-01

## 2021-06-24 NOTE — ED PROVIDER NOTES
EMERGENCY DEPARTMENT HISTORY AND PHYSICAL EXAM      Date: 6/24/2021  Patient Name: Gamaliel Velasquez    History of Presenting Illness     Chief Complaint   Patient presents with    Sore Throat       History Provided By: Patient    HPI: Gamaliel Velasquez, 50 y.o. female   presents to the ED with cc of throat. Patient complains of sore throat that started this morning. No fever chills. No cough. No nasal congestion postnasal drip. No difficulty swallowing. No change in voice. PCP: Shamar Kern MD    No current facility-administered medications on file prior to encounter. Current Outpatient Medications on File Prior to Encounter   Medication Sig Dispense Refill    buPROPion (WELLBUTRIN) 100 mg tablet Take 100 mg by mouth two (2) times a day.  levothyroxine (Synthroid) 150 mcg tablet Take 150 mcg by mouth Daily (before breakfast).  pregabalin (Lyrica) 100 mg capsule Take 100 mg by mouth two (2) times a day.  rosuvastatin (CRESTOR) 40 mg tablet Take 40 mg by mouth nightly.  spironolactone (ALDACTONE) 100 mg tablet Take 100 mg by mouth daily.  dexlansoprazole (Dexilant) 30 mg capsule Take 30 mg by mouth daily.  cariprazine (Vraylar) 1.5 mg capsule Take 1.5 mg by mouth daily.  insulin glargine (Lantus Solostar U-100 Insulin) 100 unit/mL (3 mL) inpn 40 Units by SubCUTAneous route two (2) times a day.  linagliptin (TRADJENTA PO) Take  by mouth.  insulin aspart U-100 (NovoLOG Flexpen U-100 Insulin) 100 unit/mL (3 mL) inpn 8 Units by SubCUTAneous route two (2) times a day.          Past History     Past Medical History:  Past Medical History:   Diagnosis Date    Asthma     Bipolar 1 disorder ([address]     Carpal tunnel syndrome, bilateral     Chronic obstructive pulmonary disease (HCC)     Diabetes ([facility] [address]     High cholesterol     Hypothyroidism        Past Surgical History:  Past Surgical History:   Procedure Laterality Date    HX CHOLECYSTECTOMY      HX OTHER SURGICAL      Uterine Ablation    HX TONSILLECTOMY      HX TUBAL LIGATION         Family History:  History reviewed. No pertinent family history. Social History:  Social History     Tobacco Use    Smoking status: Current Every Day Smoker     Packs/day: 0.25    Smokeless tobacco: Never Used   Substance Use Topics    Alcohol use: Not Currently    Drug use: Not Currently       Allergies: Allergies   Allergen Reactions    Latex Rash    Adhesive Rash    Aspirin Hives    Levaquin [Levofloxacin] Other (comments)     \"my arm got really hot and broke out really red the last time I had the IV version\"    Penicillins Hives         Review of Systems   Review of Systems   Constitutional: Negative for chills and fever. HENT: Positive for sore throat. Eyes: Negative for discharge. Respiratory: Negative for shortness of breath. Cardiovascular: Negative for chest pain. Gastrointestinal: Negative for abdominal pain. Neurological: Negative for headaches. Physical Exam   Physical Exam  Vitals and nursing note reviewed. Constitutional:       Appearance: Normal appearance. HENT:      Head: Normocephalic and atraumatic. Mouth/Throat:      Mouth: Mucous membranes are moist.      Comments: Uvula is midline. Uvula is enlarged and erythematous  Eyes:      Conjunctiva/sclera: Conjunctivae normal.   Cardiovascular:      Heart sounds: Normal heart sounds. Pulmonary:      Breath sounds: Normal breath sounds. Abdominal:      General: Abdomen is flat. Palpations: Abdomen is soft. Musculoskeletal:      Cervical back: Neck supple. Skin:     General: Skin is warm and dry. Neurological:      General: No focal deficit present. Mental Status: She is alert. Psychiatric:         Behavior: Behavior normal.         Diagnostic Study Results     Labs -   No results found for this or any previous visit (from the past 12 hour(s)).     Radiologic Studies -   No orders to display     CT Results (Last 48 hours)    None        CXR Results  (Last 48 hours)    None            Medical Decision Making   I am the first provider for this patient. I reviewed the vital signs, available nursing notes, past medical history, past surgical history, family history and social history. Vital Signs-Reviewed the patient's vital signs. Patient Vitals for the past 12 hrs:   Temp Pulse Resp BP SpO2   06/24/21 0627 97.8 °F (36.6 °C) 94 20 (!) 125/103 97 %       Records Reviewed:     Provider Notes (Medical Decision Making):       ED Course:   Initial assessment performed. The patients presenting problems have been discussed, and they are in agreement with the care plan formulated and outlined with them. I have encouraged them to ask questions as they arise throughout their visit. PROCEDURES      Disposition: Condition stable   DC- Adult Discharges: All of the diagnostic tests were reviewed and questions answered. Diagnosis, care plan and treatment options were discussed. understand instructions and will follow up as directed. The patients results have been reviewed with them. They have been counseled regarding their diagnosis. The patient verbally convey understanding and agreement of the signs, symptoms, diagnosis, treatment and prognosis and additionally agrees to follow up as recommended. They also agree with the care-plan and convey that all of their questions have been answered. I have also put together some discharge instructions for them that include: 1) educational information regarding their diagnosis, 2) how to care for their diagnosis at home, as well a 3) list of reasons why they would want to return to the ED prior to their follow-up appointment, should their condition change. PLAN:  1.    Current Discharge Medication List      START taking these medications    Details   azithromycin (Zithromax Z-Rufino) 250 mg tablet As instructed in the pack  Qty: 6 Tablet, Refills: 0  Start date: 6/24/2021 predniSONE (DELTASONE) 20 mg tablet 3 tablets for 2 days then 2 tablets for 2 days then 1 tablet for 2 day  Qty: 12 Tablet, Refills: 0  Start date: 6/24/2021           2. Follow-up Information     Follow up With Specialties Details Why Contact Info    Follow up with your primary care physician  Schedule an appointment as soon as possible for a visit in 3 days As needed         Return to ED if worse     Diagnosis     Clinical Impression:   1. Uvulitis        Please note that this dictation was completed with [employer], the computer voice recognition software. Quite often unanticipated grammatical, syntax, homophones, and other interpretive errors are inadvertently transcribed by the computer software. Please disregard these errors. Please excuse any errors that have escaped final proofreading. Thank you.

## 2021-06-24 NOTE — LETTER
NOTIFICATION RETURN TO WORK / SCHOOL    6/24/2021 6:46 AM    Ms. Deven Snowden  [address] [address]      To Whom It May Concern:    Deven Snowden is currently under the care of [address] [place] [address]. She will return to work/school on: 06/26/2021    If there are questions or concerns please have the patient contact our office.         Sincerely,      Flory Ivey

## 2021-06-26 ENCOUNTER — HOSPITAL ENCOUNTER (EMERGENCY)
Age: 46
Discharge: HOME | End: 2021-06-26
Payer: MEDICARE

## 2021-06-26 VITALS
HEART RATE: 93 BPM | HEIGHT: 63 IN | TEMPERATURE: 98.6 F | WEIGHT: 281 LBS | SYSTOLIC BLOOD PRESSURE: 128 MMHG | BODY MASS INDEX: 49.79 KG/M2 | DIASTOLIC BLOOD PRESSURE: 70 MMHG | OXYGEN SATURATION: 97 % | RESPIRATION RATE: 18 BRPM

## 2021-06-26 DIAGNOSIS — K12.2 UVULITIS: Primary | ICD-10-CM

## 2021-06-26 PROCEDURE — 99283 EMERGENCY DEPT VISIT LOW MDM: CPT

## 2021-06-26 RX ORDER — CLINDAMYCIN HYDROCHLORIDE 300 MG/1
300 CAPSULE ORAL 4 TIMES DAILY
Qty: 28 CAPSULE | Refills: 0 | Status: SHIPPED | OUTPATIENT
Start: 2021-06-26 | End: 2021-07-03

## 2021-06-26 NOTE — ED TRIAGE NOTES
[facility] 13 pt was seen by an urgent care center and Dx with uvulitis about 2 days ago and since yesterday around 4 pt stated that she has pippa having fevers; pt has been taking her ABT and steroid therapy as ordered by MD; pt also stated that starting this am she has developed a cough

## 2021-06-26 NOTE — ED PROVIDER NOTES
EMERGENCY DEPARTMENT HISTORY AND PHYSICAL EXAM      Date: 6/26/2021  Patient Name: Michelle Chong    History of Presenting Illness     Chief Complaint   Patient presents with    Gland Swelling    Cough       History Provided By: Patient    HPI: Michelle Chong, 50 y.o. female with a past medical history significant diabetes, hypertension, hyperlipidemia and obesity presents to the ED with cc of uvula swelling. Patient was seen at the [facility] ER. Patient was diagnosed with uvulitis. Patient sent by her job for medical clearance. Patient was given azithromycin at [facility]. 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 Moderate severity, no known exacerbating or relieving factors, no other associated signs and symptoms. Patient specifically denies fever, chills, chest pain, shortness of breath, abdominal pain, nausea, vomiting, diarrhea. There are no other complaints, changes, or physical findings at this time. PCP: Ibeth Armstrong MD    No current facility-administered medications on file prior to encounter. Current Outpatient Medications on File Prior to Encounter   Medication Sig Dispense Refill    azithromycin (Zithromax Z-Rufino) 250 mg tablet As instructed in the pack 6 Tablet 0    predniSONE (DELTASONE) 20 mg tablet 3 tablets for 2 days then 2 tablets for 2 days then 1 tablet for 2 day 12 Tablet 0    buPROPion (WELLBUTRIN) 100 mg tablet Take 100 mg by mouth two (2) times a day.  levothyroxine (Synthroid) 150 mcg tablet Take 150 mcg by mouth Daily (before breakfast).  pregabalin (Lyrica) 100 mg capsule Take 100 mg by mouth two (2) times a day.  rosuvastatin (CRESTOR) 40 mg tablet Take 40 mg by mouth nightly.  spironolactone (ALDACTONE) 100 mg tablet Take 100 mg by mouth daily.  dexlansoprazole (Dexilant) 30 mg capsule Take 30 mg by mouth daily.  cariprazine (Vraylar) 1.5 mg capsule Take 1.5 mg by mouth daily.       insulin glargine (Lantus Solostar U-100 Insulin) 100 unit/mL (3 mL) inpn 40 Units by SubCUTAneous route two (2) times a day.  linagliptin (TRADJENTA PO) Take  by mouth.  insulin aspart U-100 (NovoLOG Flexpen U-100 Insulin) 100 unit/mL (3 mL) inpn 8 Units by SubCUTAneous route two (2) times a day. Past History     Past Medical History:  Past Medical History:   Diagnosis Date    Asthma     [facility] 1 disorder ([facility] [address] [address]     Carpal tunnel syndrome, bilateral     Chronic obstructive pulmonary disease (HCC)     Diabetes ([facility] [address]     High cholesterol     Hypothyroidism        Past Surgical History:  Past Surgical History:   Procedure Laterality Date    HX CHOLECYSTECTOMY      HX OTHER SURGICAL      Uterine Ablation    HX TONSILLECTOMY      HX TUBAL LIGATION         Family History:  No family history on file. Social History:  Social History     Tobacco Use    Smoking status: Current Every Day Smoker     Packs/day: 0.25    Smokeless tobacco: Never Used   Substance Use Topics    Alcohol use: Not Currently    Drug use: Not Currently       Allergies: Allergies   Allergen Reactions    Latex Rash    Adhesive Rash    Aspirin Hives    Levaquin [Levofloxacin] Other (comments)     \"my arm got really hot and broke out really red the last time I had the IV version\"    Penicillins Hives         Review of Systems     Review of Systems   Constitutional: Negative for chills, fatigue and fever. HENT: Positive for sore throat. Negative for congestion, drooling, sinus pressure, trouble swallowing and voice change. Eyes: Negative for photophobia and pain. Respiratory: Negative for cough and shortness of breath. Cardiovascular: Negative for chest pain and leg swelling. Gastrointestinal: Negative for abdominal pain, diarrhea, nausea and vomiting. Endocrine: Negative for polydipsia, polyphagia and polyuria.    Genitourinary: Negative for decreased urine volume, difficulty urinating, dysuria, hematuria and urgency. Musculoskeletal: Negative for back pain, gait problem, myalgias and neck pain. Skin: Negative for pallor and rash. Allergic/Immunologic: Negative for environmental allergies and food allergies. Neurological: Negative for dizziness, facial asymmetry, speech difficulty, numbness and headaches. Hematological: Negative for adenopathy. Does not bruise/bleed easily. Psychiatric/Behavioral: Negative for agitation, self-injury and suicidal ideas. The patient is not nervous/anxious. Physical Exam     Physical Exam  Vitals and nursing note reviewed. Constitutional:       Appearance: Normal appearance. HENT:      Head: Normocephalic and atraumatic. Right Ear: Tympanic membrane and external ear normal.      Left Ear: Tympanic membrane and external ear normal.      Nose: Nose normal.      Mouth/Throat:      Mouth: Mucous membranes are moist.      Pharynx: Oropharyngeal exudate, posterior oropharyngeal erythema and uvula swelling present. Eyes:      Extraocular Movements: Extraocular movements intact. Pupils: Pupils are equal, round, and reactive to light. Cardiovascular:      Rate and Rhythm: Normal rate and regular rhythm. Pulses: Normal pulses. Heart sounds: Normal heart sounds. Pulmonary:      Breath sounds: Normal breath sounds. Abdominal:      General: Abdomen is flat. Palpations: Abdomen is soft. Musculoskeletal:         General: Normal range of motion. Cervical back: Normal range of motion and neck supple. Skin:     General: Skin is warm and dry. Capillary Refill: Capillary refill takes less than 2 seconds. Neurological:      General: No focal deficit present. Mental Status: She is alert and oriented to person, place, and time. Mental status is at baseline.    Psychiatric:         Mood and Affect: Mood normal.         Behavior: Behavior normal.         Lab and Diagnostic Study Results     Labs -   No results found for this or any previous visit (from the past 12 hour(s)). Radiologic Studies -   @lastxrresult@  CT Results  (Last 48 hours)    None        CXR Results  (Last 48 hours)    None            Medical Decision Making   - I am the first provider for this patient. - I reviewed the vital signs, available nursing notes, past medical history, past surgical history, family history and social history. - Initial assessment performed. The patients presenting problems have been discussed, and they are in agreement with the care plan formulated and outlined with them. I have encouraged them to ask questions as they arise throughout their visit. Vital Signs-Reviewed the patient's vital signs. No data found. Records Reviewed: Nursing Notes and Old Medical Records          ED Course:          Provider Notes (Medical Decision Making):   DDx: viral pharyngitis, strep pharyngitis, URI, flu like illness    Pt presents with sore throat; stable vitals and nontoxic appearing. Airway stable. On clinical exam, the patient has no peritonsillar abscess, voice chances or uvula deviation to suggest peritonsillar abscess    There is no drooling, tripodding, voice changes, dysphagia/odynophagia, or difficulty breathing to suggest epiglottitis    There are no voices changes, buldge to back of throat, dysphagia/odynophagia, difficulty with flexing/extending neck to suggest retreophayngeal abscess    There is no induration to the sumental area to suggest Ludwigs angina. Furthermore, dentition is not poor    Will order strep test. If negative, treat for viral pharyngitis. MDM       Procedures   Medical Decision Makingedical Decision Making  Performed by: Diana Norman NP  PROCEDURES:  Procedures       Disposition   Disposition: DC- Adult Discharges: All of the diagnostic tests were reviewed and questions answered. Diagnosis, care plan and treatment options were discussed. The patient understands the instructions and will follow up as directed.  The patients results have been reviewed with them. They have been counseled regarding their diagnosis. The patient verbally convey understanding and agreement of the signs, symptoms, diagnosis, treatment and prognosis and additionally agrees to follow up as recommended with their PCP in 24 - 48 hours. They also agree with the care-plan and convey that all of their questions have been answered. I have also put together some discharge instructions for them that include: 1) educational information regarding their diagnosis, 2) how to care for their diagnosis at home, as well a 3) list of reasons why they would want to return to the ED prior to their follow-up appointment, should their condition change. Discharged    DISCHARGE PLAN:  1. Current Discharge Medication List      CONTINUE these medications which have NOT CHANGED    Details   azithromycin (Zithromax Z-Rufino) 250 mg tablet As instructed in the pack  Qty: 6 Tablet, Refills: 0      predniSONE (DELTASONE) 20 mg tablet 3 tablets for 2 days then 2 tablets for 2 days then 1 tablet for 2 day  Qty: 12 Tablet, Refills: 0      buPROPion (WELLBUTRIN) 100 mg tablet Take 100 mg by mouth two (2) times a day. levothyroxine (Synthroid) 150 mcg tablet Take 150 mcg by mouth Daily (before breakfast). pregabalin (Lyrica) 100 mg capsule Take 100 mg by mouth two (2) times a day. rosuvastatin (CRESTOR) 40 mg tablet Take 40 mg by mouth nightly. spironolactone (ALDACTONE) 100 mg tablet Take 100 mg by mouth daily. dexlansoprazole (Dexilant) 30 mg capsule Take 30 mg by mouth daily. cariprazine (Vraylar) 1.5 mg capsule Take 1.5 mg by mouth daily. insulin glargine (Lantus Solostar U-100 Insulin) 100 unit/mL (3 mL) inpn 40 Units by SubCUTAneous route two (2) times a day. linagliptin (TRADJENTA PO) Take  by mouth. insulin aspart U-100 (NovoLOG Flexpen U-100 Insulin) 100 unit/mL (3 mL) inpn 8 Units by SubCUTAneous route two (2) times a day. 2.   Follow-up Information     Follow up With Specialties Details Why Contact Info    Candida Phan MD Family Medicine   [address]  [address] [facility]  [phone]      Trey Perez MD Otolaryngology Schedule an appointment as soon as possible for a visit   [address]  [facility]  [phone]          3. Return to ED if worse   4. Discharge Medication List as of 6/26/2021 12:02 PM      START taking these medications    Details   clindamycin (CLEOCIN) 300 mg capsule Take 1 Capsule by mouth four (4) times daily for 7 days. , Normal, Disp-28 Capsule, R-0         CONTINUE these medications which have NOT CHANGED    Details   azithromycin (Zithromax Z-Rufino) 250 mg tablet As instructed in the pack, Normal, Disp-6 Tablet, R-0      predniSONE (DELTASONE) 20 mg tablet 3 tablets for 2 days then 2 tablets for 2 days then 1 tablet for 2 day, Normal, Disp-12 Tablet, R-0      buPROPion (WELLBUTRIN) 100 mg tablet Take 100 mg by mouth two (2) times a day., Historical Med      levothyroxine (Synthroid) 150 mcg tablet Take 150 mcg by mouth Daily (before breakfast). , Historical Med      pregabalin (Lyrica) 100 mg capsule Take 100 mg by mouth two (2) times a day., Historical Med      rosuvastatin (CRESTOR) 40 mg tablet Take 40 mg by mouth nightly., Historical Med      spironolactone (ALDACTONE) 100 mg tablet Take 100 mg by mouth daily. , Historical Med      dexlansoprazole (Dexilant) 30 mg capsule Take 30 mg by mouth daily. , Historical Med      cariprazine (Vraylar) 1.5 mg capsule Take 1.5 mg by mouth daily. , Historical Med      insulin glargine (Lantus Solostar U-100 Insulin) 100 unit/mL (3 mL) inpn 40 Units by SubCUTAneous route two (2) times a day., Historical Med      linagliptin (TRADJENTA PO) Take  by mouth., Historical Med      insulin aspart U-100 (NovoLOG Flexpen U-100 Insulin) 100 unit/mL (3 mL) inpn 8 Units by SubCUTAneous route two (2) times a day. , Historical Med               Diagnosis     Clinical Impression:   1. Uvulitis        Attestations:    Osvaldo Cano NP    Please note that this dictation was completed with Jibestream, the computer voice recognition software. Quite often unanticipated grammatical, syntax, homophones, and other interpretive errors are inadvertently transcribed by the computer software. Please disregard these errors. Please excuse any errors that have escaped final proofreading. Thank you.

## 2021-06-26 NOTE — LETTER
[facility] EMERGENCY DEPT  [address] [place] [id number]  [phone]    Work/School Note    Date: 6/26/2021    To Whom It May concern:    Nusrat Anderson was seen and treated today in the emergency room by the following provider(s):  Nurse Practitioner: Tristin Boudreaux NP.      Nusrat Andesron is excused from work/school on 06/26/21 and 06/27/21. She is medically clear to return to work/school on 6/28/2021. Sincerely,    EMY Ivy NP/ LAURENT Limon RN

## 2021-06-26 NOTE — ED NOTES
12:09 PM    Reviewed discharge paperwork with patient who verbalized understanding of d/c instruction, follow up, and RX. No complaints verbalized. No acute distress noted. Self ambulated to waiting room to be d/c home.

## 2021-08-01 ENCOUNTER — APPOINTMENT (OUTPATIENT)
Dept: CT IMAGING | Age: 46
End: 2021-08-01
Attending: FAMILY MEDICINE
Payer: MEDICARE

## 2021-08-01 ENCOUNTER — HOSPITAL ENCOUNTER (EMERGENCY)
Age: 46
Discharge: HOME | End: 2021-08-01
Attending: FAMILY MEDICINE
Payer: MEDICARE

## 2021-08-01 VITALS
DIASTOLIC BLOOD PRESSURE: 78 MMHG | WEIGHT: 278 LBS | BODY MASS INDEX: 51.16 KG/M2 | HEIGHT: 62 IN | RESPIRATION RATE: 20 BRPM | HEART RATE: 92 BPM | TEMPERATURE: 98.3 F | OXYGEN SATURATION: 97 % | SYSTOLIC BLOOD PRESSURE: 128 MMHG

## 2021-08-01 DIAGNOSIS — N20.0 RENAL CALCULI: ICD-10-CM

## 2021-08-01 DIAGNOSIS — R10.31 ABDOMINAL PAIN, RIGHT LOWER QUADRANT: Primary | ICD-10-CM

## 2021-08-01 LAB
ALBUMIN SERPL-MCNC: 3.5 G/DL (ref 3.5–5)
ALBUMIN/GLOB SERPL: 0.9 {RATIO} (ref 1.1–2.2)
ALP SERPL-CCNC: 105 U/L (ref 45–117)
ALT SERPL-CCNC: 25 U/L (ref 12–78)
ANION GAP SERPL CALC-SCNC: 11 MMOL/L (ref 5–15)
APPEARANCE UR: CLEAR
AST SERPL W P-5'-P-CCNC: 13 U/L (ref 15–37)
BACTERIA URNS QL MICRO: ABNORMAL /HPF
BASOPHILS # BLD: 0.1 K/UL (ref 0–0.1)
BASOPHILS NFR BLD: 1 % (ref 0–1)
BILIRUB SERPL-MCNC: 0.3 MG/DL (ref 0.2–1)
BILIRUB UR QL: NEGATIVE
BUN SERPL-MCNC: 14 MG/DL (ref 6–20)
BUN/CREAT SERPL: 16 (ref 12–20)
CA-I BLD-MCNC: 8.8 MG/DL (ref 8.5–10.1)
CHLORIDE SERPL-SCNC: 104 MMOL/L (ref 97–108)
CO2 SERPL-SCNC: 24 MMOL/L (ref 21–32)
COLOR UR: YELLOW
CREAT SERPL-MCNC: 0.87 MG/DL (ref 0.55–1.02)
DIFFERENTIAL METHOD BLD: ABNORMAL
EOSINOPHIL # BLD: 0.1 K/UL (ref 0–0.4)
EOSINOPHIL NFR BLD: 1 % (ref 0–7)
EPITH CASTS URNS QL MICRO: ABNORMAL /LPF
ERYTHROCYTE [DISTWIDTH] IN BLOOD BY AUTOMATED COUNT: 12.9 % (ref 11.5–14.5)
GLOBULIN SER CALC-MCNC: 3.7 G/DL (ref 2–4)
GLUCOSE SERPL-MCNC: 293 MG/DL (ref 65–100)
GLUCOSE UR STRIP.AUTO-MCNC: >1000 MG/DL
HCG UR QL: NEGATIVE
HCT VFR BLD AUTO: 46.3 % (ref 35–47)
HGB BLD-MCNC: 15.1 G/DL (ref 11.5–16)
HGB UR QL STRIP: NEGATIVE
IMM GRANULOCYTES # BLD AUTO: 0.1 K/UL (ref 0–0.04)
IMM GRANULOCYTES NFR BLD AUTO: 1 % (ref 0–0.5)
KETONES UR QL STRIP.AUTO: NEGATIVE MG/DL
LEUKOCYTE ESTERASE UR QL STRIP.AUTO: NEGATIVE
LYMPHOCYTES # BLD: 3.3 K/UL (ref 0.8–3.5)
LYMPHOCYTES NFR BLD: 30 % (ref 12–49)
MCH RBC QN AUTO: 31.6 PG (ref 26–34)
MCHC RBC AUTO-ENTMCNC: 32.6 G/DL (ref 30–36.5)
MCV RBC AUTO: 96.9 FL (ref 80–99)
MONOCYTES # BLD: 0.6 K/UL (ref 0–1)
MONOCYTES NFR BLD: 6 % (ref 5–13)
NEUTS SEG # BLD: 6.7 K/UL (ref 1.8–8)
NEUTS SEG NFR BLD: 61 % (ref 32–75)
NITRITE UR QL STRIP.AUTO: NEGATIVE
PH UR STRIP: 6 [PH] (ref 5–8)
PLATELET # BLD AUTO: 234 K/UL (ref 150–400)
PMV BLD AUTO: 11.1 FL (ref 8.9–12.9)
POTASSIUM SERPL-SCNC: 4 MMOL/L (ref 3.5–5.1)
PROT SERPL-MCNC: 7.2 G/DL (ref 6.4–8.2)
PROT UR STRIP-MCNC: NEGATIVE MG/DL
RBC # BLD AUTO: 4.78 M/UL (ref 3.8–5.2)
RBC #/AREA URNS HPF: ABNORMAL /HPF (ref 0–5)
SODIUM SERPL-SCNC: 139 MMOL/L (ref 136–145)
SP GR UR REFRACTOMETRY: 1.01 (ref 1–1.03)
UROBILINOGEN UR QL STRIP.AUTO: 0.1 EU/DL (ref 0.2–1)
WBC # BLD AUTO: 10.8 K/UL (ref 3.6–11)
WBC URNS QL MICRO: ABNORMAL /HPF (ref 0–4)

## 2021-08-01 PROCEDURE — 74176 CT ABD & PELVIS W/O CONTRAST: CPT

## 2021-08-01 PROCEDURE — 85025 COMPLETE CBC W/AUTO DIFF WBC: CPT

## 2021-08-01 PROCEDURE — 96374 THER/PROPH/DIAG INJ IV PUSH: CPT

## 2021-08-01 PROCEDURE — 81025 URINE PREGNANCY TEST: CPT

## 2021-08-01 PROCEDURE — 74011250636 HC RX REV CODE- 250/636: Performed by: FAMILY MEDICINE

## 2021-08-01 PROCEDURE — 80053 COMPREHEN METABOLIC PANEL: CPT

## 2021-08-01 PROCEDURE — 81003 URINALYSIS AUTO W/O SCOPE: CPT

## 2021-08-01 PROCEDURE — 99283 EMERGENCY DEPT VISIT LOW MDM: CPT

## 2021-08-01 RX ORDER — KETOROLAC TROMETHAMINE 15 MG/ML
15 INJECTION, SOLUTION INTRAMUSCULAR; INTRAVENOUS ONCE
Status: COMPLETED | OUTPATIENT
Start: 2021-08-01 | End: 2021-08-01

## 2021-08-01 RX ORDER — INSULIN LISPRO 100 [IU]/ML
30 INJECTION, SOLUTION INTRAVENOUS; SUBCUTANEOUS
COMMUNITY
End: 2022-09-27 | Stop reason: SDUPTHER

## 2021-08-01 RX ORDER — MEDROXYPROGESTERONE ACETATE 150 MG/ML
150 INJECTION, SUSPENSION INTRAMUSCULAR ONCE
COMMUNITY
End: 2022-10-02 | Stop reason: ALTCHOICE

## 2021-08-01 RX ORDER — INSULIN GLARGINE 100 [IU]/ML
60 INJECTION, SOLUTION SUBCUTANEOUS 3 TIMES DAILY
COMMUNITY
End: 2022-09-27 | Stop reason: SDUPTHER

## 2021-08-01 RX ADMIN — KETOROLAC TROMETHAMINE 15 MG: 15 INJECTION, SOLUTION INTRAMUSCULAR; INTRAVENOUS at 05:02

## 2021-08-01 NOTE — DISCHARGE INSTRUCTIONS
Thank you! Thank you for allowing me to care for you in the emergency department. I sincerely hope that you are satisfied with your visit today. It is my goal to provide you with excellent care. Below you will find a list of your labs and imaging from your visit today. Should you have any questions regarding these results please do not hesitate to call the emergency department. Labs -     Recent Results (from the past 12 hour(s))   URINALYSIS W/ RFLX MICROSCOPIC    Collection Time: 08/01/21  4:01 AM   Result Value Ref Range    Color Yellow      Appearance Clear Clear      Specific gravity 1.015 1.003 - 1.030      pH (UA) 6.0 5.0 - 8.0      Protein Negative Negative mg/dL    Glucose >1,000 (A) Negative mg/dL    Ketone Negative Negative mg/dL    Bilirubin Negative Negative      Blood Negative Negative      Urobilinogen 0.1 (L) 0.2 - 1.0 EU/dL    Nitrites Negative Negative      Leukocyte Esterase Negative Negative      WBC 0-4 0 - 4 /hpf    RBC 0-5 0 - 5 /hpf    Epithelial cells Few Few /lpf    Bacteria 1+ (A) Negative /hpf   HCG URINE, QL    Collection Time: 08/01/21  4:18 AM   Result Value Ref Range    HCG urine, QL Negative Negative     CBC WITH AUTOMATED DIFF    Collection Time: 08/01/21  4:30 AM   Result Value Ref Range    WBC 10.8 3.6 - 11.0 K/uL    RBC 4.78 3.80 - 5.20 M/uL    HGB 15.1 11.5 - 16.0 g/dL    HCT 46.3 35.0 - 47.0 %    MCV 96.9 80.0 - 99.0 FL    MCH 31.6 26.0 - 34.0 PG    MCHC 32.6 30.0 - 36.5 g/dL    RDW 12.9 11.5 - 14.5 %    PLATELET 262 719 - 551 K/uL    MPV 11.1 8.9 - 12.9 FL    NEUTROPHILS 61 32 - 75 %    LYMPHOCYTES 30 12 - 49 %    MONOCYTES 6 5 - 13 %    EOSINOPHILS 1 0 - 7 %    BASOPHILS 1 0 - 1 %    IMMATURE GRANULOCYTES 1 (H) 0.0 - 0.5 %    ABS. NEUTROPHILS 6.7 1.8 - 8.0 K/UL    ABS. LYMPHOCYTES 3.3 0.8 - 3.5 K/UL    ABS. MONOCYTES 0.6 0.0 - 1.0 K/UL    ABS. EOSINOPHILS 0.1 0.0 - 0.4 K/UL    ABS. BASOPHILS 0.1 0.0 - 0.1 K/UL    ABS. IMM.  GRANS. 0.1 (H) 0.00 - 0.04 K/UL    DF AUTOMATED     METABOLIC PANEL, COMPREHENSIVE    Collection Time: 08/01/21  4:30 AM   Result Value Ref Range    Sodium 139 136 - 145 mmol/L    Potassium 4.0 3.5 - 5.1 mmol/L    Chloride 104 97 - 108 mmol/L    CO2 24 21 - 32 mmol/L    Anion gap 11 5 - 15 mmol/L    Glucose 293 (H) 65 - 100 mg/dL    BUN 14 6 - 20 mg/dL    Creatinine 0.87 0.55 - 1.02 mg/dL    BUN/Creatinine ratio 16 12 - 20      GFR est AA >60 >60 ml/min/1.73m2    GFR est non-AA >60 >60 ml/min/1.73m2    Calcium 8.8 8.5 - 10.1 mg/dL    Bilirubin, total 0.3 0.2 - 1.0 mg/dL    AST (SGOT) 13 (L) 15 - 37 U/L    ALT (SGPT) 25 12 - 78 U/L    Alk. phosphatase 105 45 - 117 U/L    Protein, total 7.2 6.4 - 8.2 g/dL    Albumin 3.5 3.5 - 5.0 g/dL    Globulin 3.7 2.0 - 4.0 g/dL    A-G Ratio 0.9 (L) 1.1 - 2.2         Radiologic Studies -   CT ABD PELV WO CONT   Final Result   No acute abdominopelvic abnormality. Hepatomegaly, correlate with   liver function exams. Stable appearance of nonobstructing bilateral renal   calculi. Diverticulosis without diverticulitis. CT Results  (Last 48 hours)                 08/01/21 0446  CT ABD PELV WO CONT Final result    Impression:  No acute abdominopelvic abnormality. Hepatomegaly, correlate with   liver function exams. Stable appearance of nonobstructing bilateral renal   calculi. Diverticulosis without diverticulitis. Narrative:  HISTORY:   History of renal calculi, right lower quadrant pain     Dose reduction technique: All CT scans at this facility are performed using dose reduction optimization   technique as appropriate on the exam including the following: Automated exposure   control, adjustment of the MA and/or KV according to patient size and/or use of   iterative reconstructive technique. TECHNIQUE: CT of the abdomen and pelvis without contrast   COMPARISON: 5/27/2021   LIMITATIONS: None       CHEST: No acute airspace process or pleural effusion seen at the lung bases.                    LIVER: Enlarged, 19.7 cm craniocaudal based on sagittal image 43 series 203. GALLBLADDER: Cholecystectomy   BILIARY TREE: Normal.           PANCREAS: Normal.            SPLEEN: Normal.           ADRENAL GLANDS: Normal.       KIDNEYS/URETERS/BLADDER: Multiple bilateral nonobstructing renal calculi as on   prior with all of the visualized stones measuring 5 mm or less and without   evidence of ureteral stone or obstruction. Urinary bladder unremarkable. Clement Fly RETROPERITONEUM/AORTA: Normal.      BOWEL/MESENTERY: Diverticulosis of the distal colon as on prior with no evidence   of acute diverticulitis. Bowel appears negative for acute abnormality. APPENDIX: Identified and normal.         PERITONEAL CAVITY: Normal.          REPRODUCTIVE ORGANS: Normal.           BONE/TISSUES: No acute abnormality. OTHER: None. CXR Results  (Last 48 hours)      None               If you feel that you have not received excellent quality care or timely care, please ask to speak to the nurse manager. Please choose us in the future for your continued health care needs. ------------------------------------------------------------------------------------------------------------  The exam and treatment you received in the Emergency Department were for an urgent problem and are not intended as complete care. It is important that you follow-up with a doctor, nurse practitioner, or physician assistant to:  (1) confirm your diagnosis,  (2) re-evaluation of changes in your illness and treatment, and  (3) for ongoing care. If your symptoms become worse or you do not improve as expected and you are unable to reach your usual health care provider, you should return to the Emergency Department. We are available 24 hours a day. Please take your discharge instructions with you when you go to your follow-up appointment.      If you have any problem arranging a follow-up appointment, contact the Emergency Department immediately. If a prescription has been provided, please have it filled as soon as possible to prevent a delay in treatment. Read the entire medication instruction sheet provided to you by the pharmacy. If you have any questions or reservations about taking the medication due to side effects or interactions with other medications, please call your primary care physician or contact the ER to speak with the charge nurse. Make an appointment with your family doctor or the physician you were referred to for follow-up of this visit as instructed on your discharge paperwork, as this is a mandatory follow-up. Return to the ER if you are unable to be seen or if you are unable to be seen in a timely manner. If you have any problem arranging the follow-up visit, contact the Emergency Department immediately.

## 2021-08-01 NOTE — ED PROVIDER NOTES
EMERGENCY DEPARTMENT HISTORY AND PHYSICAL EXAM      Date: 8/1/2021  Patient Name: Jackie Carrasco    History of Presenting Illness     Chief Complaint   Patient presents with    Flank Pain       History Provided By:     HPI: Jackie Carrasco, is an extremely pleasant 50 y.o. female presenting to the ED with a chief complaint of right groin pain. She states he has a history of kidney stones and this feels similar. She has an upcoming appointment with urology. She came to the emergency department as the pain became progressively more sharp. No nausea, vomiting or diarrhea. No fevers, chills or rigors. No dysuria, hematuria nor frequency. No flank pain. No alleviating or aggravating factors other than pain is worse when she presses on her right lower quadrant. There are no other complaints, changes, or physical findings at this time. PCP: Heavenly Madden MD    No current facility-administered medications on file prior to encounter. Current Outpatient Medications on File Prior to Encounter   Medication Sig Dispense Refill    insulin glargine (LANTUS,BASAGLAR) 100 unit/mL (3 mL) inpn 60 Units by SubCUTAneous route two (2) times a day.  insulin lispro (HumaLOG U-100 Insulin) 100 unit/mL injection 30 Units by SubCUTAneous route Before breakfast, lunch, and dinner.  empagliflozin (Jardiance) 10 mg tablet Take  by mouth daily.  medroxyPROGESTERone (Depo-Provera) 150 mg/mL injection 150 mg by IntraMUSCular route once. Last dose July 1st      buPROPion [facility]) 100 mg tablet Take 100 mg by mouth two (2) times a day.  levothyroxine (Synthroid) 150 mcg tablet Take 150 mcg by mouth Daily (before breakfast).  pregabalin (Lyrica) 100 mg capsule Take 100 mg by mouth two (2) times a day.  rosuvastatin (CRESTOR) 40 mg tablet Take 40 mg by mouth nightly.  spironolactone (ALDACTONE) 100 mg tablet Take 100 mg by mouth daily.       dexlansoprazole (Dexilant) 30 mg capsule Take 30 mg by mouth daily.  linagliptin (TRADJENTA PO) Take  by mouth.  [DISCONTINUED] azithromycin (Zithromax Z-Rufino) 250 mg tablet As instructed in the pack 6 Tablet 0    [DISCONTINUED] predniSONE (DELTASONE) 20 mg tablet 3 tablets for 2 days then 2 tablets for 2 days then 1 tablet for 2 day 12 Tablet 0    [DISCONTINUED] cariprazine (Vraylar) 1.5 mg capsule Take 1.5 mg by mouth daily.  [DISCONTINUED] insulin glargine (Lantus Solostar U-100 Insulin) 100 unit/mL (3 mL) inpn 40 Units by SubCUTAneous route two (2) times a day.  [DISCONTINUED] insulin aspart U-100 (NovoLOG Flexpen U-100 Insulin) 100 unit/mL (3 mL) inpn 8 Units by SubCUTAneous route two (2) times a day. Past History     Past Medical History:  Past Medical History:   Diagnosis Date    Asthma     Bipolar 1 disorder ([facility] [address]     Carpal tunnel syndrome, bilateral     Chronic obstructive pulmonary disease ([facility] [facility]     Diabetes ([facility] [address] [facility]     High cholesterol     Hypothyroidism     Kidney stones     Nicotine vapor product user        Past Surgical History:  Past Surgical History:   Procedure Laterality Date    HX CHOLECYSTECTOMY      HX OTHER SURGICAL      Uterine Ablation    HX TONSILLECTOMY      HX TUBAL LIGATION         Family History:  History reviewed. No pertinent family history. Social History:  Social History     Tobacco Use    Smoking status: Former Smoker     Packs/day: 0.25    Smokeless tobacco: Never Used   Substance Use Topics    Alcohol use: Not Currently    Drug use: Not Currently       Allergies: Allergies   Allergen Reactions    Latex Rash    Adhesive Rash    Aspirin Hives    Levaquin [Levofloxacin] Other (comments)     \"my arm got really hot and broke out really red the last time I had the IV version\"    Penicillins Hives          Review of Systems     Review of Systems   Constitutional: Negative for activity change, appetite change, chills, fatigue and fever.    HENT: Negative for congestion and sore throat. Eyes: Negative for photophobia and visual disturbance. Respiratory: Negative for cough, shortness of breath and wheezing. Cardiovascular: Negative for chest pain, palpitations and leg swelling. Gastrointestinal: Negative for abdominal pain, diarrhea, nausea and vomiting. Endocrine: Negative for cold intolerance and heat intolerance. Genitourinary:        See HPI   Musculoskeletal: Negative for gait problem and joint swelling. See HPI   Skin: Negative for color change and rash. Neurological: Negative for dizziness and headaches. Physical Exam     Physical Exam  Constitutional:       Appearance: She is well-developed. HENT:      Head: Normocephalic and atraumatic. Mouth/Throat:      Mouth: Mucous membranes are moist.      Pharynx: Oropharynx is clear. Eyes:      Conjunctiva/sclera: Conjunctivae normal.      Pupils: Pupils are equal, round, and reactive to light. Cardiovascular:      Rate and Rhythm: Normal rate and regular rhythm. Pulses: Normal pulses. Heart sounds: Normal heart sounds. No murmur heard. Pulmonary:      Effort: No respiratory distress. Breath sounds: No stridor. No wheezing, rhonchi or rales. Abdominal:      General: There is no distension. Tenderness: There is no abdominal tenderness. There is no right CVA tenderness, left CVA tenderness, guarding or rebound. Comments: Right lower quadrant tenderness with palpation. Musculoskeletal:      Cervical back: Normal range of motion and neck supple. Skin:     General: Skin is warm and dry. Neurological:      General: No focal deficit present. Mental Status: She is alert and oriented to person, place, and time.    Psychiatric:         Mood and Affect: Mood normal.         Behavior: Behavior normal.         Lab and Diagnostic Study Results     Labs -     Recent Results (from the past 12 hour(s))   URINALYSIS W/ RFLX MICROSCOPIC    Collection Time: 08/01/21  4:01 AM Result Value Ref Range    Color Yellow      Appearance Clear Clear      Specific gravity 1.015 1.003 - 1.030      pH (UA) 6.0 5.0 - 8.0      Protein Negative Negative mg/dL    Glucose >1,000 (A) Negative mg/dL    Ketone Negative Negative mg/dL    Bilirubin Negative Negative      Blood Negative Negative      Urobilinogen 0.1 (L) 0.2 - 1.0 EU/dL    Nitrites Negative Negative      Leukocyte Esterase Negative Negative      WBC 0-4 0 - 4 /hpf    RBC 0-5 0 - 5 /hpf    Epithelial cells Few Few /lpf    Bacteria 1+ (A) Negative /hpf   HCG URINE, QL    Collection Time: 08/01/21  4:18 AM   Result Value Ref Range    HCG urine, QL Negative Negative     CBC WITH AUTOMATED DIFF    Collection Time: 08/01/21  4:30 AM   Result Value Ref Range    WBC 10.8 3.6 - 11.0 K/uL    RBC 4.78 3.80 - 5.20 M/uL    HGB 15.1 11.5 - 16.0 g/dL    HCT 46.3 35.0 - 47.0 %    MCV 96.9 80.0 - 99.0 FL    MCH 31.6 26.0 - 34.0 PG    MCHC 32.6 30.0 - 36.5 g/dL    RDW 12.9 11.5 - 14.5 %    PLATELET 292 363 - 171 K/uL    MPV 11.1 8.9 - 12.9 FL    NEUTROPHILS 61 32 - 75 %    LYMPHOCYTES 30 12 - 49 %    MONOCYTES 6 5 - 13 %    EOSINOPHILS 1 0 - 7 %    BASOPHILS 1 0 - 1 %    IMMATURE GRANULOCYTES 1 (H) 0.0 - 0.5 %    ABS. NEUTROPHILS 6.7 1.8 - 8.0 K/UL    ABS. LYMPHOCYTES 3.3 0.8 - 3.5 K/UL    ABS. MONOCYTES 0.6 0.0 - 1.0 K/UL    ABS. EOSINOPHILS 0.1 0.0 - 0.4 K/UL    ABS. BASOPHILS 0.1 0.0 - 0.1 K/UL    ABS. IMM.  GRANS. 0.1 (H) 0.00 - 0.04 K/UL    DF AUTOMATED     METABOLIC PANEL, COMPREHENSIVE    Collection Time: 08/01/21  4:30 AM   Result Value Ref Range    Sodium 139 136 - 145 mmol/L    Potassium 4.0 3.5 - 5.1 mmol/L    Chloride 104 97 - 108 mmol/L    CO2 24 21 - 32 mmol/L    Anion gap 11 5 - 15 mmol/L    Glucose 293 (H) 65 - 100 mg/dL    BUN 14 6 - 20 mg/dL    Creatinine 0.87 0.55 - 1.02 mg/dL    BUN/Creatinine ratio 16 12 - 20      GFR est AA >60 >60 ml/min/1.73m2    GFR est non-AA >60 >60 ml/min/1.73m2    Calcium 8.8 8.5 - 10.1 mg/dL    Bilirubin, total 0.3 0.2 - 1.0 mg/dL    AST (SGOT) 13 (L) 15 - 37 U/L    ALT (SGPT) 25 12 - 78 U/L    Alk. phosphatase 105 45 - 117 U/L    Protein, total 7.2 6.4 - 8.2 g/dL    Albumin 3.5 3.5 - 5.0 g/dL    Globulin 3.7 2.0 - 4.0 g/dL    A-G Ratio 0.9 (L) 1.1 - 2.2         Radiologic Studies -   @lastxrresult@  CT Results  (Last 48 hours)               08/01/21 0446  CT ABD PELV WO CONT Final result    Impression:  No acute abdominopelvic abnormality. Hepatomegaly, correlate with   liver function exams. Stable appearance of nonobstructing bilateral renal   calculi. Diverticulosis without diverticulitis. Narrative:  HISTORY:   History of renal calculi, right lower quadrant pain     Dose reduction technique: All CT scans at this facility are performed using dose reduction optimization   technique as appropriate on the exam including the following: Automated exposure   control, adjustment of the MA and/or KV according to patient size and/or use of   iterative reconstructive technique. TECHNIQUE: CT of the abdomen and pelvis without contrast   COMPARISON: 5/27/2021   LIMITATIONS: None       CHEST: No acute airspace process or pleural effusion seen at the lung bases. LIVER: Enlarged, 19.7 cm craniocaudal based on sagittal image 43 series 203. GALLBLADDER: Cholecystectomy   BILIARY TREE: Normal.           PANCREAS: Normal.            SPLEEN: Normal.           ADRENAL GLANDS: Normal.       KIDNEYS/URETERS/BLADDER: Multiple bilateral nonobstructing renal calculi as on   prior with all of the visualized stones measuring 5 mm or less and without   evidence of ureteral stone or obstruction. Urinary bladder unremarkable. Cam Sunday RETROPERITONEUM/AORTA: Normal.      BOWEL/MESENTERY: Diverticulosis of the distal colon as on prior with no evidence   of acute diverticulitis. Bowel appears negative for acute abnormality.    APPENDIX: Identified and normal.         PERITONEAL CAVITY: Normal.          REPRODUCTIVE ORGANS: Normal.           BONE/TISSUES: No acute abnormality. OTHER: None. CXR Results  (Last 48 hours)    None            Medical Decision Making   - I am the first provider for this patient. - I reviewed the vital signs, available nursing notes, past medical history, past surgical history, family history and social history. - Initial assessment performed. The patients presenting problems have been discussed, and they are in agreement with the care plan formulated and outlined with them. I have encouraged them to ask questions as they arise throughout their visit. Vital Signs-Reviewed the patient's vital signs. Patient Vitals for the past 12 hrs:   Temp Pulse Resp BP SpO2   08/01/21 0416 98.3 °F (36.8 °C) 92 20 128/78 97 %         ED Course/ Provider Notes (Medical Decision Making):     Patient presented to the emergency department with a chief complaint of right lower quadrant pain. Differential diagnosis includes appendicitis, ureteral calculi, enteritis, diverticulitis. She states this feels similar to prior kidney stones. She is afebrile, nontoxic. No rebound or guarding. No nausea nor vomiting. Blood glucose 293. She is diabetic. Bicarb 24, no anion gap. Laboratory work is otherwise without marked abnormality. CT stone protocol:No acute abdominopelvic abnormality. Hepatomegaly, correlate with  liver function exams. Stable appearance of nonobstructing bilateral renal  calculi. Diverticulosis without diverticulitis. Tiffanie Taylor Appendix is identified and normal.  Patient is feeling better after Toradol. It is very possible patient may have recently passed a stone and is no longer symptomatic. She has close follow-up with her urologist.  She was discharged home feeling significantly better. Sher Hand was given a thorough list of signs and symptoms that would warrant an immediate return to the emergency department.  Otherwise Sher Hand will follow up with PCP. Procedures   Medical Decision Makingedical Decision Making  Performed by: Charo Park DO  Procedures  None       Disposition   Disposition:     Home     All of the diagnostic tests were reviewed and questions answered. Diagnosis, care plan and treatment options were discussed. The patient understands the instructions and will follow up as directed. The patients results have been reviewed with them. They have been counseled regarding their diagnosis. The patient verbally convey understanding and agreement of the signs, symptoms, diagnosis, treatment and prognosis and additionally agrees to follow up as recommended with their PCP in 24 - 48 hours. They also agree with the care-plan and convey that all of their questions have been answered. I have also put together some discharge instructions for them that include: 1) educational information regarding their diagnosis, 2) how to care for their diagnosis at home, as well a 3) list of reasons why they would want to return to the ED prior to their follow-up appointment, should their condition change. DISCHARGE PLAN:    1. Current Discharge Medication List      CONTINUE these medications which have NOT CHANGED    Details   insulin glargine (LANTUS,BASAGLAR) 100 unit/mL (3 mL) inpn 60 Units by SubCUTAneous route two (2) times a day. insulin lispro (HumaLOG U-100 Insulin) 100 unit/mL injection 30 Units by SubCUTAneous route Before breakfast, lunch, and dinner. empagliflozin (Jardiance) 10 mg tablet Take  by mouth daily. medroxyPROGESTERone (Depo-Provera) 150 mg/mL injection 150 mg by IntraMUSCular route once. Last dose July 1st      buPROPion [facility]) 100 mg tablet Take 100 mg by mouth two (2) times a day. levothyroxine (Synthroid) 150 mcg tablet Take 150 mcg by mouth Daily (before breakfast). pregabalin (Lyrica) 100 mg capsule Take 100 mg by mouth two (2) times a day.       rosuvastatin (CRESTOR) 40 mg tablet Take 40 mg by mouth nightly. spironolactone (ALDACTONE) 100 mg tablet Take 100 mg by mouth daily. dexlansoprazole (Dexilant) 30 mg capsule Take 30 mg by mouth daily. linagliptin (TRADJENTA PO) Take  by mouth. 2.   Follow-up Information     Follow up With Specialties Details Why Contact Info    Aida Chaparro MD Family Medicine Schedule an appointment as soon as possible for a visit   [address] [id number]  [address]  [phone]      Please make sure to make your appointment with urology  Schedule an appointment as soon as possible for a visit               3.  Return to ED if worse       4. Discharge Medication List as of 8/1/2021  5:50 AM            Diagnosis     Clinical Impression:    1. Abdominal pain, right lower quadrant    2. Renal calculi        Attestations:    Emigdio Slider, DO    Please note that this dictation was completed with [employer], the computer voice recognition software. Quite often unanticipated grammatical, syntax, homophones, and other interpretive errors are inadvertently transcribed by the computer software. Please disregard these errors. Please excuse any errors that have escaped final proofreading. Thank you.

## 2021-08-01 NOTE — ED TRIAGE NOTES
Pt states RLQ right flank pain, x 1 month, seen and dx with kidney sones, has followed up with urology but pain has not gone away

## 2021-08-02 ENCOUNTER — HOSPITAL ENCOUNTER (EMERGENCY)
Age: 46
Discharge: STILL ADMITTED OR ADMINISTRATIVE | End: 2021-08-02

## 2021-08-10 ENCOUNTER — HOSPITAL ENCOUNTER (EMERGENCY)
Age: 46
Discharge: HOME | End: 2021-08-10
Attending: FAMILY MEDICINE
Payer: MEDICARE

## 2021-08-10 ENCOUNTER — APPOINTMENT (OUTPATIENT)
Dept: GENERAL RADIOLOGY | Age: 46
End: 2021-08-10
Attending: FAMILY MEDICINE
Payer: MEDICARE

## 2021-08-10 VITALS
SYSTOLIC BLOOD PRESSURE: 129 MMHG | BODY MASS INDEX: 51.71 KG/M2 | OXYGEN SATURATION: 97 % | DIASTOLIC BLOOD PRESSURE: 62 MMHG | RESPIRATION RATE: 16 BRPM | TEMPERATURE: 98.4 F | WEIGHT: 281 LBS | HEART RATE: 83 BPM | HEIGHT: 62 IN

## 2021-08-10 DIAGNOSIS — M25.551 RIGHT HIP PAIN: ICD-10-CM

## 2021-08-10 DIAGNOSIS — N20.0 RENAL CALCULI: Primary | ICD-10-CM

## 2021-08-10 LAB
APPEARANCE UR: CLEAR
BILIRUB UR QL: NEGATIVE
COLOR UR: ABNORMAL
GLUCOSE UR STRIP.AUTO-MCNC: >1000 MG/DL
HCG UR QL: NEGATIVE
HGB UR QL STRIP: NEGATIVE
KETONES UR QL STRIP.AUTO: NEGATIVE MG/DL
LEUKOCYTE ESTERASE UR QL STRIP.AUTO: NEGATIVE
NITRITE UR QL STRIP.AUTO: NEGATIVE
PH UR STRIP: 5 [PH] (ref 5–8)
PROT UR STRIP-MCNC: NEGATIVE MG/DL
SP GR UR REFRACTOMETRY: 1.01 (ref 1–1.03)
UROBILINOGEN UR QL STRIP.AUTO: 0.1 EU/DL (ref 0.2–1)

## 2021-08-10 PROCEDURE — 81003 URINALYSIS AUTO W/O SCOPE: CPT

## 2021-08-10 PROCEDURE — 73502 X-RAY EXAM HIP UNI 2-3 VIEWS: CPT

## 2021-08-10 PROCEDURE — 81025 URINE PREGNANCY TEST: CPT

## 2021-08-10 PROCEDURE — 99283 EMERGENCY DEPT VISIT LOW MDM: CPT

## 2021-08-10 PROCEDURE — 96372 THER/PROPH/DIAG INJ SC/IM: CPT

## 2021-08-10 PROCEDURE — 74011250636 HC RX REV CODE- 250/636: Performed by: FAMILY MEDICINE

## 2021-08-10 RX ORDER — KETOROLAC TROMETHAMINE 30 MG/ML
30 INJECTION, SOLUTION INTRAMUSCULAR; INTRAVENOUS ONCE
Status: COMPLETED | OUTPATIENT
Start: 2021-08-10 | End: 2021-08-10

## 2021-08-10 RX ORDER — KETOROLAC TROMETHAMINE 10 MG/1
10 TABLET, FILM COATED ORAL
Qty: 20 TABLET | Refills: 0 | Status: SHIPPED | OUTPATIENT
Start: 2021-08-10 | End: 2021-08-15

## 2021-08-10 RX ORDER — TAMSULOSIN HYDROCHLORIDE 0.4 MG/1
0.4 CAPSULE ORAL DAILY
Qty: 15 CAPSULE | Refills: 0 | Status: SHIPPED | OUTPATIENT
Start: 2021-08-10 | End: 2021-08-25

## 2021-08-10 RX ADMIN — KETOROLAC TROMETHAMINE 30 MG: 30 INJECTION, SOLUTION INTRAMUSCULAR; INTRAVENOUS at 09:23

## 2021-08-10 NOTE — ED TRIAGE NOTES
Pt c/o right hip pain x3 months. Has had a fall in this time frame. Denies dysuria or issues having BM.   Ambulating slowly

## 2021-08-10 NOTE — ED PROVIDER NOTES
EMERGENCY DEPARTMENT HISTORY AND PHYSICAL EXAM      Date: 8/10/2021  Patient Name: Broderick Hastings    History of Presenting Illness     Chief Complaint   Patient presents with    Hip Pain       History Provided By: Patient    HPI: Broderick Hastings, 50 y.o. female presents to the ED with cc of right groin pain. She was seen at [facility] 10 days ago for similar symptoms. She was diagnosed with nephrolithiasis but was told she did not have it. She has a history of recurrent kidney stones. She returns today because the pain is progressive. Describes as a sharp pain that radiates down the groin. Worse with palpation and movement. She also reports that she fell a month ago and believes there is pain in the hip. No nausea, vomiting, diarrhea. No fever, body aches, chills. No dysuria, hematuria, increased urgency or frequency to void. No flank pain. She takes Tylenol for pain relief. There are no other complaints, changes, or physical findings at this time. PCP: Milla Espitia MD    No current facility-administered medications on file prior to encounter. Current Outpatient Medications on File Prior to Encounter   Medication Sig Dispense Refill    insulin glargine (LANTUS,BASAGLAR) 100 unit/mL (3 mL) inpn 60 Units by SubCUTAneous route two (2) times a day.  insulin lispro (HumaLOG U-100 Insulin) 100 unit/mL injection 30 Units by SubCUTAneous route Before breakfast, lunch, and dinner.  empagliflozin (Jardiance) 10 mg tablet Take  by mouth daily.  medroxyPROGESTERone (Depo-Provera) 150 mg/mL injection 150 mg by IntraMUSCular route once. Last dose July 1st      buPROPion [facility]) 100 mg tablet Take 100 mg by mouth two (2) times a day.  levothyroxine (Synthroid) 150 mcg tablet Take 150 mcg by mouth Daily (before breakfast).  pregabalin (Lyrica) 100 mg capsule Take 100 mg by mouth two (2) times a day.  rosuvastatin (CRESTOR) 40 mg tablet Take 40 mg by mouth nightly.       spironolactone (ALDACTONE) 100 mg tablet Take 100 mg by mouth daily.  dexlansoprazole (Dexilant) 30 mg capsule Take 30 mg by mouth daily.  linagliptin (TRADJENTA PO) Take  by mouth. Past History     Past Medical History:  Past Medical History:   Diagnosis Date    Asthma     [facility]     Carpal tunnel syndrome, bilateral     Chronic obstructive pulmonary disease (HCC)     Diabetes [facility]     High cholesterol     Hypothyroidism     Kidney stones     Nicotine vapor product user        Past Surgical History:  Past Surgical History:   Procedure Laterality Date    HX CHOLECYSTECTOMY      HX OTHER SURGICAL      Uterine Ablation    HX TONSILLECTOMY      HX TUBAL LIGATION         Family History:  No family history on file. Social History:  Social History     Tobacco Use    Smoking status: Former Smoker     Packs/day: 0.25    Smokeless tobacco: Never Used   Substance Use Topics    Alcohol use: Not Currently    Drug use: Not Currently       Allergies: Allergies   Allergen Reactions    Latex Rash    Adhesive Rash    Aspirin Hives    Levaquin [Levofloxacin] Other (comments)     \"my arm got really hot and broke out really red the last time I had the IV version\"    Penicillins Hives         Review of Systems     Review of Systems   Constitutional: Negative for chills and fever. HENT: Negative for congestion and sore throat. Eyes: Negative for photophobia and visual disturbance. Respiratory: Negative for cough and shortness of breath. Cardiovascular: Negative for chest pain and palpitations. Gastrointestinal: Negative for nausea and vomiting. Genitourinary: Positive for pelvic pain. Negative for dysuria and flank pain. Musculoskeletal: Negative for arthralgias, back pain and myalgias. Skin: Negative for rash and wound. Allergic/Immunologic: Negative for environmental allergies and food allergies.    Neurological: Negative for light-headedness and headaches. All other systems reviewed and are negative. Physical Exam     Physical Exam  Vitals and nursing note reviewed. Constitutional:       Appearance: Normal appearance. She is normal weight. HENT:      Head: Normocephalic and atraumatic. Eyes:      Extraocular Movements: Extraocular movements intact. Conjunctiva/sclera: Conjunctivae normal.   Cardiovascular:      Rate and Rhythm: Normal rate and regular rhythm. Pulses: Normal pulses. Heart sounds: Normal heart sounds. Pulmonary:      Effort: Pulmonary effort is normal.      Breath sounds: Normal breath sounds. Abdominal:      General: Abdomen is flat. Bowel sounds are normal. There is no distension. Palpations: Abdomen is soft. Tenderness: There is no abdominal tenderness. There is no right CVA tenderness, left CVA tenderness or guarding. Negative signs include Chiu's sign, Rovsing's sign, McBurney's sign and psoas sign. Hernia: No hernia is present. Musculoskeletal:         General: No swelling. Normal range of motion. Skin:     General: Skin is warm. Capillary Refill: Capillary refill takes less than 2 seconds. Neurological:      General: No focal deficit present. Mental Status: She is alert and oriented to person, place, and time. Psychiatric:         Mood and Affect: Mood normal.         Behavior: Behavior normal.         Thought Content:  Thought content normal.         Judgment: Judgment normal.         Lab and Diagnostic Study Results     Labs -     Recent Results (from the past 12 hour(s))   URINALYSIS W/ RFLX MICROSCOPIC    Collection Time: 08/10/21  7:45 AM   Result Value Ref Range    Color Straw      Appearance Clear Clear      Specific gravity 1.010 1.003 - 1.030      pH (UA) 5.0 5.0 - 8.0      Protein Negative Negative mg/dL    Glucose >1,000 (A) Negative mg/dL    Ketone Negative Negative mg/dL    Bilirubin Negative Negative      Blood Negative Negative      Urobilinogen 0.1 (L) 0.2 - 1.0 EU/dL    Nitrites Negative Negative      Leukocyte Esterase Negative Negative     HCG URINE, QL    Collection Time: 08/10/21  7:45 AM   Result Value Ref Range    HCG urine, QL Negative Negative         Radiologic Studies -   @lastxrresult@  CT Results  (Last 48 hours)    None        CXR Results  (Last 48 hours)    None            Medical Decision Making   - I am the first provider for this patient. - I reviewed the vital signs, available nursing notes, past medical history, past surgical history, family history and social history. - Initial assessment performed. The patients presenting problems have been discussed, and they are in agreement with the care plan formulated and outlined with them. I have encouraged them to ask questions as they arise throughout their visit. Vital Signs-Reviewed the patient's vital signs. Patient Vitals for the past 12 hrs:   Temp Pulse Resp BP SpO2   08/10/21 0716 -- -- -- -- 97 %   08/10/21 0710 98.4 °F (36.9 °C) 83 16 129/62 97 %       Records Reviewed: Nursing Notes and Old Medical Records    The patient presents with abdominal pain with a differential diagnosis of abdominal pain, renal Colic and UTI    ED Course:          Provider Notes (Medical Decision Making):     MDM  Number of Diagnoses or Management Options  Risk of Complications, Morbidity, and/or Mortality  General comments: 9:18 AM  Patient is stable with no marked toxicity or distress. No significant findings on physical exam.  The patient symptoms similar to HPI from 10 days ago. She was told that she did not have a kidney stone but recent CT shows bilateral small renal calculi. No treatment used for this diagnosis. DC'd on Flomax and recommend for her to follow-up with urology. The cause of the hip pain is unknown at this time but there is no structural abnormality found on x-ray. This could be more arthritic pain. Recommend take Tylenol or ibuprofen.   She has a schedule appointment with her primary care doctor within a month. Reviewed all radiographic and laboratory results with the patient. All questions were answered and there are no apparent barriers to comprehension or communication. The patient verbalized agreement with the diagnosis, treatment plan, and understanding of the follow-up instructions. The patient is appropriate for discharge; leaves the Emergency Department walking with a stable gait. Patient understands to return to the ED for any new or worsening symptoms. Procedures   Medical Decision Makingedical Decision Making  Performed by: Mona Camejo DO  PROCEDURES:  Procedures       Disposition   Disposition: Condition improved  DC- Adult Discharges: All of the diagnostic tests were reviewed and questions answered. Diagnosis, care plan and treatment options were discussed. The patient understands the instructions and will follow up as directed. The patients results have been reviewed with them. They have been counseled regarding their diagnosis. The patient verbally convey understanding and agreement of the signs, symptoms, diagnosis, treatment and prognosis and additionally agrees to follow up as recommended with their PCP in 24 - 48 hours. They also agree with the care-plan and convey that all of their questions have been answered. I have also put together some discharge instructions for them that include: 1) educational information regarding their diagnosis, 2) how to care for their diagnosis at home, as well a 3) list of reasons why they would want to return to the ED prior to their follow-up appointment, should their condition change. DISCHARGE PLAN:  1. Current Discharge Medication List      CONTINUE these medications which have NOT CHANGED    Details   insulin glargine (LANTUS,BASAGLAR) 100 unit/mL (3 mL) inpn 60 Units by SubCUTAneous route two (2) times a day.       insulin lispro (HumaLOG U-100 Insulin) 100 unit/mL injection 30 Units by SubCUTAneous route Before breakfast, lunch, and dinner. empagliflozin (Jardiance) 10 mg tablet Take  by mouth daily. medroxyPROGESTERone (Depo-Provera) 150 mg/mL injection 150 mg by IntraMUSCular route once. Last dose July 1st      buPROPion [facility]) 100 mg tablet Take 100 mg by mouth two (2) times a day. levothyroxine (Synthroid) 150 mcg tablet Take 150 mcg by mouth Daily (before breakfast). pregabalin (Lyrica) 100 mg capsule Take 100 mg by mouth two (2) times a day. rosuvastatin (CRESTOR) 40 mg tablet Take 40 mg by mouth nightly. spironolactone (ALDACTONE) 100 mg tablet Take 100 mg by mouth daily. dexlansoprazole (Dexilant) 30 mg capsule Take 30 mg by mouth daily. linagliptin (TRADJENTA PO) Take  by mouth. 2.   Follow-up Information     Follow up With Specialties Details Why Michael Neal MD Urology In 3 days  [address]      Kathia Vargas MD Urology In 3 days  [address] [facility] [address] [place] [address]  [phone]          3. Return to ED if worse   4. Current Discharge Medication List      START taking these medications    Details   tamsulosin (Flomax) 0.4 mg capsule Take 1 Capsule by mouth daily for 15 days. Qty: 15 Capsule, Refills: 0  Start date: 8/10/2021, End date: 8/25/2021      ketorolac (TORADOL) 10 mg tablet Take 1 Tablet by mouth every six (6) hours as needed for Pain for up to 5 days. Qty: 20 Tablet, Refills: 0  Start date: 8/10/2021, End date: 8/15/2021               Diagnosis     Clinical Impression:   1. Renal calculi        Attestations:    Jersey Sanchez, DO    Please note that this dictation was completed with [employer], the computer voice recognition software. Quite often unanticipated grammatical, syntax, homophones, and other interpretive errors are inadvertently transcribed by the computer software. Please disregard these errors.   Please excuse any errors that have escaped final proofreading. Thank you.

## 2021-08-17 ENCOUNTER — OFFICE VISIT (OUTPATIENT)
Dept: PODIATRY | Age: 46
End: 2021-08-17
Payer: MEDICARE

## 2021-08-17 VITALS
HEIGHT: 62 IN | SYSTOLIC BLOOD PRESSURE: 136 MMHG | BODY MASS INDEX: 53.11 KG/M2 | TEMPERATURE: 97.7 F | DIASTOLIC BLOOD PRESSURE: 85 MMHG | WEIGHT: 288.6 LBS | HEART RATE: 82 BPM

## 2021-08-17 DIAGNOSIS — E11.42 TYPE 2 DIABETES MELLITUS WITH DIABETIC POLYNEUROPATHY, WITH LONG-TERM CURRENT USE OF INSULIN (HCC): ICD-10-CM

## 2021-08-17 DIAGNOSIS — Z79.4 TYPE 2 DIABETES MELLITUS WITH DIABETIC POLYNEUROPATHY, WITH LONG-TERM CURRENT USE OF INSULIN (HCC): ICD-10-CM

## 2021-08-17 DIAGNOSIS — B35.1 ONYCHOMYCOSIS: Primary | ICD-10-CM

## 2021-08-17 PROCEDURE — 11721 DEBRIDE NAIL 6 OR MORE: CPT | Performed by: PODIATRIST

## 2021-08-17 PROCEDURE — G8427 DOCREV CUR MEDS BY ELIG CLIN: HCPCS | Performed by: PODIATRIST

## 2021-08-17 PROCEDURE — 3046F HEMOGLOBIN A1C LEVEL >9.0%: CPT | Performed by: PODIATRIST

## 2021-08-17 PROCEDURE — G8510 SCR DEP NEG, NO PLAN REQD: HCPCS | Performed by: PODIATRIST

## 2021-08-17 PROCEDURE — G8419 CALC BMI OUT NRM PARAM NOF/U: HCPCS | Performed by: PODIATRIST

## 2021-08-17 PROCEDURE — 11755 BIOPSY NAIL UNIT: CPT | Performed by: PODIATRIST

## 2021-08-17 PROCEDURE — 2022F DILAT RTA XM EVC RTNOPTHY: CPT | Performed by: PODIATRIST

## 2021-08-17 PROCEDURE — 99203 OFFICE O/P NEW LOW 30 MIN: CPT | Performed by: PODIATRIST

## 2021-08-17 NOTE — PROGRESS NOTES
[facility]    Subjective:         Patient is a 50 y.o. female who is being seen as a new pt for a diabetic pedal exam. Pt states her last PCP visit was 6-4-2021 with Dr. Oralia Lopez. She describes her diabetes as being under control, noting her last hemoglobin A1c was 7.6%. She admits to mild pedal pain, rising to the level of 2 out of 10. She states the pain is exacerbated weightbearing and close toed shoes. She states the thickness of her toenails may contribute to the pain. She states testing has never been performed to confirm the diagnosis. She denies any systemic signs of infection. She denies any recent pedal trauma. She denies any other pedal complaints    Past Medical History:   Diagnosis Date    Asthma     [facility] ([facility]     Carpal tunnel syndrome, bilateral     Chronic obstructive pulmonary disease (HCC)     Diabetes ([facility]     High cholesterol     Hypothyroidism     Kidney stones     Nicotine vapor product user      Past Surgical History:   Procedure Laterality Date    HX CHOLECYSTECTOMY      HX OTHER SURGICAL      Uterine Ablation    HX TONSILLECTOMY      HX TUBAL LIGATION         History reviewed. No pertinent family history. Social History     Tobacco Use    Smoking status: Former Smoker     Packs/day: 0.25    Smokeless tobacco: Never Used   Substance Use Topics    Alcohol use: Not Currently     Allergies   Allergen Reactions    Latex Rash    Adhesive Rash    Aspirin Hives    Levaquin [Levofloxacin] Other (comments)     \"my arm got really hot and broke out really red the last time I had the IV version\"    Penicillins Hives     Prior to Admission medications    Medication Sig Start Date End Date Taking? Authorizing Provider   tamsulosin (Flomax) 0.4 mg capsule Take 1 Capsule by mouth daily for 15 days.  8/10/21 8/25/21 Yes Virginia Avila, DO   insulin glargine (LANTUS,BASAGLAR) 100 unit/mL (3 mL) inpn 60 Units by SubCUTAneous route two (2) times a day. Yes Other, MD Eleuterio   insulin lispro (HumaLOG U-100 Insulin) 100 unit/mL injection 30 Units by SubCUTAneous route Before breakfast, lunch, and dinner. Yes Tanya, MD Eleuterio   empagliflozin (Jardiance) 10 mg tablet Take  by mouth daily. Yes Tanya, MD Eleuterio   medroxyPROGESTERone (Depo-Provera) 150 mg/mL injection 150 mg by IntraMUSCular route once. Last dose July 1st   Yes Tanya, MD Eleuterio   buPROPion [facility]) 100 mg tablet Take 100 mg by mouth two (2) times a day. Yes Tanya, MD Eleuterio   levothyroxine (Synthroid) 150 mcg tablet Take 150 mcg by mouth Daily (before breakfast). Yes Tanya, MD Eleuterio   pregabalin (Lyrica) 100 mg capsule Take 100 mg by mouth two (2) times a day. Yes Tanya, MD Eleuterio   rosuvastatin (CRESTOR) 40 mg tablet Take 40 mg by mouth nightly. Yes Eleuterio Martínez MD   spironolactone (ALDACTONE) 100 mg tablet Take 100 mg by mouth daily. Yes Tanya, MD Eleuterio   dexlansoprazole (Dexilant) 30 mg capsule Take 30 mg by mouth daily. Yes Tanya, MD Eleuterio   linagliptin (TRADJENTA PO) Take  by mouth. Yes Tanya, MD Eleuterio       Review of Systems   Constitutional: Negative. HENT: Negative. Eyes: Negative. Respiratory: Negative. Cardiovascular: Positive for leg swelling. Gastrointestinal: Negative. Endocrine: Positive for cold intolerance. Genitourinary: Negative. Musculoskeletal: Negative. Skin: Negative. Allergic/Immunologic: Positive for immunocompromised state. Neurological: Positive for numbness. Hematological: Negative. Psychiatric/Behavioral: Positive for dysphoric mood. All other systems reviewed and are negative. Objective:     Visit Vitals  /85 (BP 1 Location: Right upper arm, BP Patient Position: Sitting, BP Cuff Size: Adult)   Pulse 82   Temp 97.7 °F (36.5 °C) (Temporal)   Ht 5' 2\" (1.575 m)   Wt 288 lb 9.6 oz (130.9 kg)   BMI 52.79 kg/m²       Physical Exam  Vitals reviewed.    Constitutional:       Appearance: She is morbidly obese.   Cardiovascular:      Pulses:           Dorsalis pedis pulses are 2+ on the right side and 2+ on the left side. Posterior tibial pulses are 2+ on the right side and 2+ on the left side. Pulmonary:      Effort: Pulmonary effort is normal.   Musculoskeletal:      Right lower leg: Edema present. Left lower leg: Edema present. Right foot: Normal range of motion. No deformity or bunion. Left foot: Normal range of motion. No deformity or bunion. Feet:      Right foot:      Protective Sensation: 10 sites tested. 10 sites sensed. Skin integrity: Skin integrity normal.      Toenail Condition: Right toenails are abnormally thick. Fungal disease present. Left foot:      Protective Sensation: 10 sites tested. 10 sites sensed. Skin integrity: Skin integrity normal.      Toenail Condition: Left toenails are abnormally thick. Fungal disease present. Lymphadenopathy:      Lower Body: No right inguinal adenopathy. No left inguinal adenopathy. Skin:     General: Skin is warm. Capillary Refill: Capillary refill takes 2 to 3 seconds. Comments: Absent pedal hair growth with hyperpigmentation to the skin   Neurological:      Mental Status: She is alert and oriented to person, place, and time. Comments: Paresthesias noted to B/L LE   Psychiatric:         Mood and Affect: Mood and affect normal.         Behavior: Behavior is cooperative. Data Review: No results found for this or any previous visit (from the past 24 hour(s)). Impression:       ICD-10-CM ICD-9-CM    1. Onychomycosis  B35.1 110.1 BIOPSY, NAIL UNIT   2. Type 2 diabetes mellitus with diabetic polyneuropathy, with long-term current use of insulin ([facility])  E11.42 250.60     Z79.4 357.2      V58.67        Recommendation:     Patient seen and evaluated in the office  Discussed and educated patient regarding their current medical condition.   Instructed patient to monitor their feet daily, be compliant with all medications and wear supportive shoe gear. A sharp excisional debridement was performed to all 10 pedal digits with a nail nipper incident. Patient tolerated well no dressing was needed  It was determined that a biopsy of the nail unit would be taken for diagnostic purposes. A small portion of the nail unit (eg, plate, bed, matrix, hyponychium, proximal and lateral nail folds) was sharply removed from the right hallux and sent to pathology for analysis. Anesthesia and hemostasis was utilized as needed. Wound care performed as needed. Pt tolerated well. Instructed pt that when pathology results return, will discuss tx options in more depth. Reinaldo Baldwin, [facility], [address] [place] [address] and [address], [address]  O: [phone]  F: [phone]  C: [phone]

## 2021-08-17 NOTE — PROGRESS NOTES
1. Have you been to the ER, urgent care clinic since your last visit? Hospitalized since your last visit? Yes Where: [facility]    2. Have you seen or consulted any other health care providers outside of the [address] [place] [address] since your last visit? Include any pap smears or colon screening.  No     Chief Complaint   Patient presents with    Diabetic Foot Exam     next pcp visit 09/2021;last A1c 7.6%; last glucose 192

## 2021-09-01 ENCOUNTER — TELEPHONE (OUTPATIENT)
Dept: PODIATRY | Age: 46
End: 2021-09-01

## 2021-09-15 ENCOUNTER — HOSPITAL ENCOUNTER (EMERGENCY)
Age: 46
Discharge: HOME | End: 2021-09-15
Attending: EMERGENCY MEDICINE
Payer: MEDICARE

## 2021-09-15 VITALS
TEMPERATURE: 97.9 F | OXYGEN SATURATION: 96 % | DIASTOLIC BLOOD PRESSURE: 74 MMHG | HEIGHT: 62 IN | RESPIRATION RATE: 18 BRPM | BODY MASS INDEX: 53 KG/M2 | HEART RATE: 78 BPM | SYSTOLIC BLOOD PRESSURE: 118 MMHG | WEIGHT: 288 LBS

## 2021-09-15 DIAGNOSIS — R73.9 HYPERGLYCEMIA: ICD-10-CM

## 2021-09-15 DIAGNOSIS — R19.7 DIARRHEA, UNSPECIFIED TYPE: Primary | ICD-10-CM

## 2021-09-15 LAB
APPEARANCE UR: CLEAR
BILIRUB UR QL: NEGATIVE
COLOR UR: YELLOW
GLUCOSE BLD STRIP.AUTO-MCNC: 384 MG/DL (ref 65–117)
GLUCOSE UR STRIP.AUTO-MCNC: >1000 MG/DL
HGB UR QL STRIP: NEGATIVE
KETONES UR QL STRIP.AUTO: NEGATIVE MG/DL
LEUKOCYTE ESTERASE UR QL STRIP.AUTO: NEGATIVE
NITRITE UR QL STRIP.AUTO: NEGATIVE
PERFORMED BY, TECHID: ABNORMAL
PH UR STRIP: 6.5 [PH] (ref 5–8)
PROT UR STRIP-MCNC: NEGATIVE MG/DL
SP GR UR REFRACTOMETRY: 1.01 (ref 1–1.03)
UROBILINOGEN UR QL STRIP.AUTO: 0.1 EU/DL (ref 0.2–1)

## 2021-09-15 PROCEDURE — 99283 EMERGENCY DEPT VISIT LOW MDM: CPT

## 2021-09-15 PROCEDURE — 82962 GLUCOSE BLOOD TEST: CPT

## 2021-09-15 PROCEDURE — 81003 URINALYSIS AUTO W/O SCOPE: CPT

## 2021-09-16 NOTE — ED PROVIDER NOTES
EMERGENCY DEPARTMENT HISTORY AND PHYSICAL EXAM      Date: 9/15/2021  Patient Name: Archibald Fothergill    History of Presenting Illness     Chief Complaint   Patient presents with    Diarrhea       History Provided By: Patient    HPI: Archibald Fothergill, 50 y.o. female with a past medical history significant diabetes, COPD, asthma and BPAD, kidney stones presents to the ED with cc of diarrhea for 1 week and now feels malaise and severe fatigue. No fever, cough, sob. States no diarrhea today and she has been following BRAT diet and using Imodium. PCP: Wilber Larose MD    No current facility-administered medications on file prior to encounter. Current Outpatient Medications on File Prior to Encounter   Medication Sig Dispense Refill    insulin glargine (LANTUS,BASAGLAR) 100 unit/mL (3 mL) inpn 60 Units by SubCUTAneous route two (2) times a day.  insulin lispro (HumaLOG U-100 Insulin) 100 unit/mL injection 30 Units by SubCUTAneous route Before breakfast, lunch, and dinner.  empagliflozin (Jardiance) 10 mg tablet Take  by mouth daily.  medroxyPROGESTERone (Depo-Provera) 150 mg/mL injection 150 mg by IntraMUSCular route once. Last dose July 1st      buPROPion [facility]) 100 mg tablet Take 100 mg by mouth two (2) times a day.  levothyroxine (Synthroid) 150 mcg tablet Take 150 mcg by mouth Daily (before breakfast).  pregabalin (Lyrica) 100 mg capsule Take 100 mg by mouth two (2) times a day.  rosuvastatin (CRESTOR) 40 mg tablet Take 40 mg by mouth nightly.  spironolactone (ALDACTONE) 100 mg tablet Take 100 mg by mouth daily.  dexlansoprazole (Dexilant) 30 mg capsule Take 30 mg by mouth daily.  linagliptin (TRADJENTA PO) Take  by mouth.          Past History     Past Medical History:  Past Medical History:   Diagnosis Date    Asthma     B[facility] 1 disorder ([facility] [address]     Carpal tunnel syndrome, bilateral     Chronic obstructive pulmonary disease ([facility] [address] [facility]     [facility] [address] [facility]     High cholesterol     Hypothyroidism     Kidney stones     Nicotine vapor product user        Past Surgical History:  Past Surgical History:   Procedure Laterality Date    HX CHOLECYSTECTOMY      HX OTHER SURGICAL      Uterine Ablation    HX TONSILLECTOMY      HX TUBAL LIGATION         Family History:  History reviewed. No pertinent family history. Social History:  Social History     Tobacco Use    Smoking status: Current Every Day Smoker     Packs/day: 0.25    Smokeless tobacco: Never Used   Vaping Use    Vaping Use: Never used   Substance Use Topics    Alcohol use: Not Currently    Drug use: Not Currently       Allergies: Allergies   Allergen Reactions    Latex Rash    Adhesive Rash    Aspirin Hives    Levaquin [Levofloxacin] Other (comments)     \"my arm got really hot and broke out really red the last time I had the IV version\"    Penicillins Hives         Review of Systems   Review of Systems   Constitutional: Negative for fever. Gastrointestinal: Negative for abdominal pain, anal bleeding, blood in stool and vomiting. Genitourinary: Positive for frequency. Negative for dysuria. Neurological: Negative for dizziness. All other systems reviewed and are negative. Physical Exam   Physical Exam  Vitals and nursing note reviewed. Constitutional:       Appearance: Normal appearance. She is obese. She is not ill-appearing. HENT:      Head: Normocephalic and atraumatic. Nose: Nose normal.      Mouth/Throat:      Mouth: Mucous membranes are moist.      Pharynx: Oropharynx is clear. No oropharyngeal exudate or posterior oropharyngeal erythema. Eyes:      General: No scleral icterus. Extraocular Movements: Extraocular movements intact. Pupils: Pupils are equal, round, and reactive to light. Cardiovascular:      Rate and Rhythm: Normal rate and regular rhythm. Pulses: Normal pulses. Heart sounds: Normal heart sounds.    Pulmonary:      Effort: Pulmonary effort is normal.      Breath sounds: Normal breath sounds. No wheezing, rhonchi or rales. Abdominal:      General: Bowel sounds are normal.      Palpations: Abdomen is soft. Tenderness: There is no abdominal tenderness. There is no right CVA tenderness, left CVA tenderness, guarding or rebound. Musculoskeletal:         General: Normal range of motion. Cervical back: Normal range of motion and neck supple. Right lower leg: No edema. Left lower leg: No edema. Skin:     General: Skin is warm and dry. Findings: No rash. Neurological:      General: No focal deficit present. Mental Status: She is alert and oriented to person, place, and time. Comments: Nl gross motor/sensory exam without any focal or lateralizing deficits   Psychiatric:         Mood and Affect: Mood normal.         Behavior: Behavior normal.         Diagnostic Study Results     Labs -     Recent Results (from the past 12 hour(s))   GLUCOSE, POC    Collection Time: 09/15/21  8:49 PM   Result Value Ref Range    Glucose (POC) 384 (H) 65 - 117 mg/dL    Performed by Sandra MALLOY/ DMITRY MICROSCOPIC    Collection Time: 09/15/21  8:53 PM   Result Value Ref Range    Color Yellow      Appearance Clear Clear      Specific gravity 1.015 1.003 - 1.030      pH (UA) 6.5 5.0 - 8.0      Protein Negative Negative mg/dL    Glucose >1,000 (A) Negative mg/dL    Ketone Negative Negative mg/dL    Bilirubin Negative Negative      Blood Negative Negative      Urobilinogen 0.1 (L) 0.2 - 1.0 EU/dL    Nitrites Negative Negative      Leukocyte Esterase Negative Negative         Radiologic Studies -   No orders to display     CT Results  (Last 48 hours)    None        CXR Results  (Last 48 hours)    None            Medical Decision Making   I am the first provider for this patient.     I reviewed the vital signs, available nursing notes, past medical history, past surgical history, family history and social history. Vital Signs-Reviewed the patient's vital signs. Patient Vitals for the past 12 hrs:   Temp Pulse Resp BP SpO2   09/15/21 1954 97.9 °F (36.6 °C) 78 18 118/74 96 %       Records Reviewed: Nursing Notes and Old Medical Records    Provider Notes (Medical Decision Making):   Diff DX: viral enteritis, dehydration    ED Course:   Initial assessment performed. The patients presenting problems have been discussed, and they are in agreement with the care plan formulated and outlined with them. I have encouraged them to ask questions as they arise throughout their visit. UA normal, Glucose 384, pt has not taken any diabetes medicines in 2 days due to decreased p.o. intake. Advised to restart with nl evening dose tonight, continue symptomatic care and hydration and follow-up with her PCP. PLAN:  1. Discharge Medication List as of 9/15/2021  9:54 PM        2. Follow-up Information     Follow up With Specialties Details Why Contact Info    Matt Marino MD Family Medicine   [address]  [address] [place] [address]  [phone]          Return to ED if worse     Diagnosis     Clinical Impression:   1. Diarrhea, unspecified type    2.  Hyperglycemia

## 2021-11-23 ENCOUNTER — OFFICE VISIT (OUTPATIENT)
Dept: PODIATRY | Age: 46
End: 2021-11-23
Payer: MEDICARE

## 2021-11-23 VITALS
SYSTOLIC BLOOD PRESSURE: 117 MMHG | TEMPERATURE: 97.3 F | DIASTOLIC BLOOD PRESSURE: 64 MMHG | BODY MASS INDEX: 49.43 KG/M2 | HEART RATE: 74 BPM | HEIGHT: 62 IN | WEIGHT: 268.6 LBS

## 2021-11-23 DIAGNOSIS — L60.3 ONYCHODYSTROPHY: ICD-10-CM

## 2021-11-23 DIAGNOSIS — E11.65 UNCONTROLLED TYPE 2 DIABETES MELLITUS WITH HYPERGLYCEMIA (HCC): Primary | ICD-10-CM

## 2021-11-23 DIAGNOSIS — B35.3 TINEA PEDIS OF BOTH FEET: ICD-10-CM

## 2021-11-23 DIAGNOSIS — E11.42 DIABETIC POLYNEUROPATHY ASSOCIATED WITH TYPE 2 DIABETES MELLITUS (HCC): ICD-10-CM

## 2021-11-23 PROCEDURE — G8417 CALC BMI ABV UP PARAM F/U: HCPCS | Performed by: PODIATRIST

## 2021-11-23 PROCEDURE — G8432 DEP SCR NOT DOC, RNG: HCPCS | Performed by: PODIATRIST

## 2021-11-23 PROCEDURE — 99214 OFFICE O/P EST MOD 30 MIN: CPT | Performed by: PODIATRIST

## 2021-11-23 PROCEDURE — G8427 DOCREV CUR MEDS BY ELIG CLIN: HCPCS | Performed by: PODIATRIST

## 2021-11-23 PROCEDURE — 2022F DILAT RTA XM EVC RTNOPTHY: CPT | Performed by: PODIATRIST

## 2021-11-23 PROCEDURE — 3046F HEMOGLOBIN A1C LEVEL >9.0%: CPT | Performed by: PODIATRIST

## 2021-11-23 RX ORDER — CLOTRIMAZOLE AND BETAMETHASONE DIPROPIONATE 10; .64 MG/G; MG/G
CREAM TOPICAL 2 TIMES DAILY
Qty: 45 G | Refills: 0 | Status: SHIPPED | OUTPATIENT
Start: 2021-11-23 | End: 2022-03-01 | Stop reason: ALTCHOICE

## 2021-11-23 NOTE — PROGRESS NOTES
[facility]    Subjective:         Patient is a 48 y.o. female who is being seen as a returning pt for a diabetic pedal exam and with an acute complaint of pain/itching to both of her feet. Pt states her last PCP visit was 11- with Dr. Beto Gill. She describes her diabetes as being out of control, noting her last hemoglobin A1c was 10.7%. She admits to mild pedal pain, rising to the level of 3 out of 10. She states the pain is exacerbated weightbearing and close toed shoes. She states the thickness of her toenails may contribute to the pain she states she also has an itchy rash to the plantar aspect of her feet and between her toes. She states the rash has been recalcitrant to over-the-counter medications. She denies any systemic signs of infection. She denies any recent pedal trauma. She denies any other pedal complaints    Past Medical History:   Diagnosis Date    Asthma     Bipolar 1 disorder ([facility] [address]     Carpal tunnel syndrome, bilateral     Chronic obstructive pulmonary disease (HCC)     Diabetes ([facility] [address]     High cholesterol     Hypothyroidism     Kidney stones     Nicotine vapor product user      Past Surgical History:   Procedure Laterality Date    HX CHOLECYSTECTOMY      HX OTHER SURGICAL      Uterine Ablation    HX TONSILLECTOMY      HX TUBAL LIGATION         History reviewed. No pertinent family history. Social History     Tobacco Use    Smoking status: Current Every Day Smoker     Packs/day: 0.25    Smokeless tobacco: Never Used   Substance Use Topics    Alcohol use: Not Currently     Allergies   Allergen Reactions    Latex Rash    Adhesive Rash    Aspirin Hives    Levaquin [Levofloxacin] Other (comments)     \"my arm got really hot and broke out really red the last time I had the IV version\"    Penicillins Hives     Prior to Admission medications    Medication Sig Start Date End Date Taking?  Authorizing Provider   insulin glargine (LANTUS,BASAGLAR) 100 unit/mL (3 mL) inpn 60 Units by SubCUTAneous route two (2) times a day. Yes Tanya, MD Eleuterio   insulin lispro (HumaLOG U-100 Insulin) 100 unit/mL injection 30 Units by SubCUTAneous route Before breakfast, lunch, and dinner. Yes Tanya, MD Eleuterio   empagliflozin (Jardiance) 10 mg tablet Take  by mouth daily. Yes Tanya, MD Eleuterio   medroxyPROGESTERone (Depo-Provera) 150 mg/mL injection 150 mg by IntraMUSCular route once. Last dose July 1st   Yes Tanya, MD Eleuterio   buPROPion [facility]) 100 mg tablet Take 100 mg by mouth two (2) times a day. Yes Eleuterio Martínez MD   levothyroxine (Synthroid) 150 mcg tablet Take 150 mcg by mouth Daily (before breakfast). Yes Tanya, MD Eleuterio   rosuvastatin (CRESTOR) 40 mg tablet Take 40 mg by mouth nightly. Yes Tanya, MD Eleuterio   spironolactone (ALDACTONE) 100 mg tablet Take 100 mg by mouth daily. Yes Tanya, MD Eleuterio   dexlansoprazole (Dexilant) 30 mg capsule Take 30 mg by mouth daily. Yes Tanya, MD Eleuterio   linagliptin (TRADJENTA PO) Take  by mouth. Yes Tanya, MD Eleuterio       Review of Systems   Constitutional: Negative. HENT: Negative. Eyes: Negative. Respiratory: Negative. Cardiovascular: Positive for leg swelling. Gastrointestinal: Negative. Endocrine: Positive for cold intolerance. Genitourinary: Negative. Musculoskeletal: Negative. Skin: Negative. Allergic/Immunologic: Positive for immunocompromised state. Neurological: Positive for numbness. Hematological: Negative. Psychiatric/Behavioral: Positive for dysphoric mood. All other systems reviewed and are negative. Objective:     Visit Vitals  /64 (BP 1 Location: Left upper arm, BP Patient Position: Sitting, BP Cuff Size: Large adult)   Pulse 74   Temp 97.3 °F (36.3 °C) (Temporal)   Ht 5' 2\" (1.575 m)   Wt 268 lb 9.6 oz (121.8 kg)   BMI 49.13 kg/m²       Physical Exam  Vitals reviewed. Constitutional:       Appearance: She is morbidly obese.    Cardiovascular:      Pulses: Dorsalis pedis pulses are 2+ on the right side and 2+ on the left side. Posterior tibial pulses are 2+ on the right side and 2+ on the left side. Pulmonary:      Effort: Pulmonary effort is normal.   Musculoskeletal:      Right lower leg: Edema present. Left lower leg: Edema present. Right foot: Normal range of motion. No deformity or bunion. Left foot: Normal range of motion. No deformity or bunion. Feet:      Right foot:      Protective Sensation: 10 sites tested. 10 sites sensed. Skin integrity: Skin integrity normal.      Toenail Condition: Right toenails are abnormally thick. Fungal disease present. Left foot:      Protective Sensation: 10 sites tested. 10 sites sensed. Skin integrity: Skin integrity normal.      Toenail Condition: Left toenails are abnormally thick. Fungal disease present. Lymphadenopathy:      Lower Body: No right inguinal adenopathy. No left inguinal adenopathy. Skin:     General: Skin is warm. Capillary Refill: Capillary refill takes 2 to 3 seconds. Findings: Rash present. Comments: Absent pedal hair growth with hyperpigmentation to the skin   Neurological:      Mental Status: She is alert and oriented to person, place, and time. Comments: Paresthesias noted to B/L LE   Psychiatric:         Mood and Affect: Mood and affect normal.         Behavior: Behavior is cooperative. Data Review: No results found for this or any previous visit (from the past 24 hour(s)). Impression:       ICD-10-CM ICD-9-CM    1. Uncontrolled type 2 diabetes mellitus with hyperglycemia (HCC)  E11.65 250.02    2. Diabetic polyneuropathy associated with type 2 diabetes mellitus (HCC)  E11.42 250.60      357.2    3. Onychodystrophy  L60.3 703.8    4. Tinea pedis of both feet  B35.3 110.4        Recommendation:     Patient seen and evaluated in the office  Discussed and educated patient regarding their current medical condition. Instructed patient to monitor their feet daily, be compliant with all medications and wear supportive shoe gear. A prescription was given for a topical steroid/antifungal cream to be utilized twice daily for a period of 2 weeks for symptomatic relief returning. Personally reviewed her toenail plate biopsy results and discussed findings. Encourage patient to thin her nails multiple times a week at home. Patient verbalized understanding        Danni Johnson 81.  Gabrielle Rsoen, [employer], [address] [place] [address] and Adam Murphy [address]  O: [phone]  F: [phone]  C: [phone]

## 2021-11-23 NOTE — PROGRESS NOTES
1. Have you been to the ER, urgent care clinic since your last visit? Hospitalized since your last visit? Yes Where: [facility]    2. Have you seen or consulted any other health care providers outside of the [address] [place] [address] since your last visit? Include any pap smears or colon screening.  No     Chief Complaint   Patient presents with    Diabetic Foot Exam     last pcp visit 11/9/2021;last glucose 202; last A1c 10.7%

## 2022-01-07 ENCOUNTER — HOSPITAL ENCOUNTER (EMERGENCY)
Age: 47
Discharge: HOME | End: 2022-01-07
Attending: EMERGENCY MEDICINE
Payer: MEDICARE

## 2022-01-07 VITALS
TEMPERATURE: 98.5 F | WEIGHT: 261 LBS | BODY MASS INDEX: 48.03 KG/M2 | RESPIRATION RATE: 18 BRPM | OXYGEN SATURATION: 97 % | HEART RATE: 99 BPM | HEIGHT: 62 IN | DIASTOLIC BLOOD PRESSURE: 66 MMHG | SYSTOLIC BLOOD PRESSURE: 149 MMHG

## 2022-01-07 DIAGNOSIS — J20.9 ACUTE BRONCHITIS, UNSPECIFIED ORGANISM: Primary | ICD-10-CM

## 2022-01-07 PROCEDURE — 99283 EMERGENCY DEPT VISIT LOW MDM: CPT

## 2022-01-07 PROCEDURE — 74011636637 HC RX REV CODE- 636/637: Performed by: EMERGENCY MEDICINE

## 2022-01-07 RX ORDER — ALBUTEROL SULFATE 90 UG/1
1 AEROSOL, METERED RESPIRATORY (INHALATION)
Qty: 6.7 G | Refills: 1 | Status: SHIPPED | OUTPATIENT
Start: 2022-01-07

## 2022-01-07 RX ORDER — AZITHROMYCIN 250 MG/1
TABLET, FILM COATED ORAL
Qty: 6 TABLET | Refills: 0 | Status: SHIPPED | OUTPATIENT
Start: 2022-01-07 | End: 2022-09-27

## 2022-01-07 RX ORDER — IPRATROPIUM BROMIDE AND ALBUTEROL SULFATE 2.5; .5 MG/3ML; MG/3ML
3 SOLUTION RESPIRATORY (INHALATION)
Qty: 30 NEBULE | Refills: 0 | Status: SHIPPED | OUTPATIENT
Start: 2022-01-07

## 2022-01-07 RX ORDER — PREDNISONE 20 MG/1
60 TABLET ORAL ONCE
Status: COMPLETED | OUTPATIENT
Start: 2022-01-07 | End: 2022-01-07

## 2022-01-07 RX ORDER — PREDNISONE 20 MG/1
TABLET ORAL
Qty: 12 TABLET | Refills: 0 | Status: SHIPPED | OUTPATIENT
Start: 2022-01-07 | End: 2022-09-27 | Stop reason: ALTCHOICE

## 2022-01-07 RX ADMIN — PREDNISONE 60 MG: 20 TABLET ORAL at 03:36

## 2022-01-07 NOTE — ED PROVIDER NOTES
EMERGENCY DEPARTMENT HISTORY AND PHYSICAL EXAM      Date: 1/7/2022  Patient Name: Mara Carter    History of Presenting Illness     Chief Complaint   Patient presents with    Chest Congestion       History Provided By: Patient    HPI: Mara Carter, 48 y.o. female   presents to the ED with cc of chest congestion. Patient complains of paroxysmal episode of nonproductive cough with nasal congestion postnasal drip for last several days. Patient denies any contact with a COVID-19 infection. Patient received Covid vaccination including booster. No fever chills. Patient has a nebulizer machine for her history of asthma but ran out of solution. No fever chills. No shortness of breath. No chest pain. No abdominal pain. PCP: Angélica Chiu MD    No current facility-administered medications on file prior to encounter. Current Outpatient Medications on File Prior to Encounter   Medication Sig Dispense Refill    clotrimazole-betamethasone (LOTRISONE) topical cream Apply  to affected area two (2) times a day. 45 g 0    insulin glargine (LANTUS,BASAGLAR) 100 unit/mL (3 mL) inpn 60 Units by SubCUTAneous route two (2) times a day.  insulin lispro (HumaLOG U-100 Insulin) 100 unit/mL injection 30 Units by SubCUTAneous route Before breakfast, lunch, and dinner.  empagliflozin (Jardiance) 10 mg tablet Take  by mouth daily.  medroxyPROGESTERone (Depo-Provera) 150 mg/mL injection 150 mg by IntraMUSCular route once. Last dose July 1st      buPROPion [facility]) 100 mg tablet Take 100 mg by mouth two (2) times a day.  levothyroxine (Synthroid) 150 mcg tablet Take 150 mcg by mouth Daily (before breakfast).  rosuvastatin (CRESTOR) 40 mg tablet Take 40 mg by mouth nightly.  spironolactone (ALDACTONE) 100 mg tablet Take 100 mg by mouth daily.  dexlansoprazole (Dexilant) 30 mg capsule Take 30 mg by mouth daily.  linagliptin (TRADJENTA PO) Take  by mouth.          Past History     Past Medical History:  Past Medical History:   Diagnosis Date    Asthma     [facility] disorder ([facility] [address]     Carpal tunnel syndrome, bilateral     Chronic obstructive pulmonary disease (HCC)     Diabetes ([facility] [address]     High cholesterol     Hypothyroidism     Kidney stones     Nicotine vapor product user        Past Surgical History:  Past Surgical History:   Procedure Laterality Date    HX CHOLECYSTECTOMY      HX OTHER SURGICAL      Uterine Ablation    HX TONSILLECTOMY      HX TUBAL LIGATION         Family History:  History reviewed. No pertinent family history. Social History:  Social History     Tobacco Use    Smoking status: Current Every Day Smoker     Packs/day: 0.25    Smokeless tobacco: Never Used   Vaping Use    Vaping Use: Never used   Substance Use Topics    Alcohol use: Not Currently    Drug use: Not Currently       Allergies: Allergies   Allergen Reactions    Latex Rash    Adhesive Rash    Aspirin Hives    Levaquin [Levofloxacin] Other (comments)     \"my arm got really hot and broke out really red the last time I had the IV version\"    Penicillins Hives    Sulfa (Sulfonamide Antibiotics) Hives         Review of Systems   Review of Systems   Constitutional: Negative for chills and fever. HENT: Positive for rhinorrhea and sore throat. Eyes: Negative for discharge. Respiratory: Positive for cough. Negative for shortness of breath. Cardiovascular: Negative for chest pain. Gastrointestinal: Negative for abdominal pain and vomiting. Genitourinary: Negative for dysuria. Musculoskeletal: Negative for joint swelling. Skin: Negative for rash. Neurological: Negative for headaches. Psychiatric/Behavioral: Negative for suicidal ideas. All other systems reviewed and are negative. Physical Exam   Physical Exam    Diagnostic Study Results     Labs -   No results found for this or any previous visit (from the past 12 hour(s)).     Radiologic Studies -   No orders to display     CT Results  (Last 48 hours)    None        CXR Results  (Last 48 hours)    None            Medical Decision Making   I am the first provider for this patient. I reviewed the vital signs, available nursing notes, past medical history, past surgical history, family history and social history. Vital Signs-Reviewed the patient's vital signs. Patient Vitals for the past 12 hrs:   Temp Pulse Resp BP SpO2   01/07/22 0318 98.5 °F (36.9 °C) 99 18 (!) 149/66 97 %       Records Reviewed:     Provider Notes (Medical Decision Making):       ED Course:   Initial assessment performed. The patients presenting problems have been discussed, and they are in agreement with the care plan formulated and outlined with them. I have encouraged them to ask questions as they arise throughout their visit. PROCEDURES      Disposition: Condition stable   DC- Adult Discharges: All of the diagnostic tests were reviewed and questions answered. Diagnosis, care plan and treatment options were discussed. understand instructions and will follow up as directed. The patients results have been reviewed with them. They have been counseled regarding their diagnosis. The patient verbally convey understanding and agreement of the signs, symptoms, diagnosis, treatment and prognosis and additionally agrees to follow up as recommended. They also agree with the care-plan and convey that all of their questions have been answered. I have also put together some discharge instructions for them that include: 1) educational information regarding their diagnosis, 2) how to care for their diagnosis at home, as well a 3) list of reasons why they would want to return to the ED prior to their follow-up appointment, should their condition change. PLAN:  1.    Current Discharge Medication List      START taking these medications    Details   azithromycin (Zithromax Z-Rufino) 250 mg tablet As instructed in the pack  Qty: 6 Tablet, Refills: 0  Start date: 1/7/2022      guaiFENesin-dextromethorphan SR (Mucinex DM) 600-30 mg per tablet Take 1 Tablet by mouth two (2) times a day. Qty: 12 Tablet, Refills: 0  Start date: 1/7/2022      predniSONE (DELTASONE) 20 mg tablet 3 tablets for 2 days then 2 tablets for 2 days then 1 tablet for 2 day  Qty: 12 Tablet, Refills: 0  Start date: 1/7/2022      albuterol (PROVENTIL HFA, VENTOLIN HFA, PROAIR HFA) 90 mcg/actuation inhaler Take 1 Puff by inhalation every four (4) hours as needed for Wheezing. Qty: 6.7 g, Refills: 1  Start date: 1/7/2022      albuterol-ipratropium (DUO-NEB) 2.5 mg-0.5 mg/3 ml nebu 3 mL by Nebulization route every six (6) hours as needed for Wheezing. Qty: 30 Nebule, Refills: 0  Start date: 1/7/2022           2. Follow-up Information     Follow up With Specialties Details Why Contact Info    Follow up with your primary care physician  Schedule an appointment as soon as possible for a visit in 3 days As needed         Return to ED if worse     Diagnosis     Clinical Impression:   1. Acute bronchitis, unspecified organism        Please note that this dictation was completed with [employer], the computer voice recognition software. Quite often unanticipated grammatical, syntax, homophones, and other interpretive errors are inadvertently transcribed by the computer software. Please disregard these errors. Please excuse any errors that have escaped final proofreading. Thank you.

## 2022-03-01 ENCOUNTER — OFFICE VISIT (OUTPATIENT)
Dept: PODIATRY | Age: 47
End: 2022-03-01
Payer: MEDICARE

## 2022-03-01 VITALS
SYSTOLIC BLOOD PRESSURE: 129 MMHG | DIASTOLIC BLOOD PRESSURE: 74 MMHG | TEMPERATURE: 97.5 F | WEIGHT: 265 LBS | HEART RATE: 95 BPM | HEIGHT: 62 IN | BODY MASS INDEX: 48.76 KG/M2

## 2022-03-01 DIAGNOSIS — B35.3 TINEA PEDIS OF BOTH FEET: ICD-10-CM

## 2022-03-01 DIAGNOSIS — M72.2 PLANTAR FASCIITIS OF LEFT FOOT: Primary | ICD-10-CM

## 2022-03-01 PROCEDURE — 99213 OFFICE O/P EST LOW 20 MIN: CPT | Performed by: PODIATRIST

## 2022-03-01 PROCEDURE — G8427 DOCREV CUR MEDS BY ELIG CLIN: HCPCS | Performed by: PODIATRIST

## 2022-03-01 PROCEDURE — G8417 CALC BMI ABV UP PARAM F/U: HCPCS | Performed by: PODIATRIST

## 2022-03-01 PROCEDURE — G8432 DEP SCR NOT DOC, RNG: HCPCS | Performed by: PODIATRIST

## 2022-03-01 RX ORDER — CHLORPHENIRAMINE MALEATE 4 MG
TABLET ORAL 2 TIMES DAILY
Qty: 60 G | Refills: 2 | Status: SHIPPED | OUTPATIENT
Start: 2022-03-01

## 2022-03-01 NOTE — PROGRESS NOTES
[facility]    Subjective:         Patient is a 48 y.o. female who is being seen as a returning pt for left heel pain and a continued rash to both of her feet. Patient states the pain is very slight, rising the level of 2 out of 10. She states pain is exacerbated with weightbearing. She continues to deny any recent trauma. She continues to deny any breaks in skin or systemic signs of infection. She states the steroid/antifungal cream that was previously prescribed did help with the rash but she believes the rash may be returning. She denies any other pedal complaints      Past Medical History:   Diagnosis Date    Asthma     [facility] [address]     Carpal tunnel syndrome, bilateral     Chronic obstructive pulmonary disease (HCC)     Diabetes ([facility] [address]     High cholesterol     Hypothyroidism     Kidney stones     Nicotine vapor product user      Past Surgical History:   Procedure Laterality Date    HX CHOLECYSTECTOMY      HX OTHER SURGICAL      Uterine Ablation    HX TONSILLECTOMY      HX TUBAL LIGATION         History reviewed. No pertinent family history. Social History     Tobacco Use    Smoking status: Current Every Day Smoker     Packs/day: 0.25    Smokeless tobacco: Never Used   Substance Use Topics    Alcohol use: Not Currently     Allergies   Allergen Reactions    Latex Rash    Adhesive Rash    Aspirin Hives    Levaquin [Levofloxacin] Other (comments)     \"my arm got really hot and broke out really red the last time I had the IV version\"    Penicillins Hives    Sulfa (Sulfonamide Antibiotics) Hives     Prior to Admission medications    Medication Sig Start Date End Date Taking? Authorizing Provider   azithromycin (Zithromax Z-Rufino) 250 mg tablet As instructed in the pack 1/7/22   Aishwarya Montano MD   guaiFENesin-dextromethorphan SR (Mucinex DM) 600-30 mg per tablet Take 1 Tablet by mouth two (2) times a day.  1/7/22   Aishwarya Montano MD   predniSONE (Lenard Messing) 20 mg tablet 3 tablets for 2 days then 2 tablets for 2 days then 1 tablet for 2 day 1/7/22   Rachael Hsieh MD   albuterol (PROVENTIL HFA, VENTOLIN HFA, PROAIR HFA) 90 mcg/actuation inhaler Take 1 Puff by inhalation every four (4) hours as needed for Wheezing. 1/7/22   Rachael Hsieh MD   albuterol-ipratropium (DUO-NEB) 2.5 mg-0.5 mg/3 ml nebu 3 mL by Nebulization route every six (6) hours as needed for Wheezing. 1/7/22   Rachael Hsieh MD   clotrimazole-betamethasone (LOTRISONE) topical cream Apply  to affected area two (2) times a day. 11/23/21   Manoj Morris DPM   insulin glargine (LANTUS,BASAGLAR) 100 unit/mL (3 mL) inpn 60 Units by SubCUTAneous route two (2) times a day. Eleuterio Martínez MD   insulin lispro (HumaLOG U-100 Insulin) 100 unit/mL injection 30 Units by SubCUTAneous route Before breakfast, lunch, and dinner. Eleuterio Martínez MD   empagliflozin (Jardiance) 10 mg tablet Take  by mouth daily. Eleuterio Martínez MD   medroxyPROGESTERone (Depo-Provera) 150 mg/mL injection 150 mg by IntraMUSCular route once. Last dose July 1st    Eleuterio Martínez MD   buPROPion [facility]) 100 mg tablet Take 100 mg by mouth two (2) times a day. Eleuterio Martínez MD   levothyroxine (Synthroid) 150 mcg tablet Take 150 mcg by mouth Daily (before breakfast). Eleuterio Martínez MD   rosuvastatin (CRESTOR) 40 mg tablet Take 40 mg by mouth nightly. Eleuterio Martínez MD   spironolactone (ALDACTONE) 100 mg tablet Take 100 mg by mouth daily. Eleuterio Martínez MD   dexlansoprazole (Dexilant) 30 mg capsule Take 30 mg by mouth daily. Eleuterio Martínez MD   linagliptin (TRADJENTA PO) Take  by mouth. Eleuterio Martínez MD       Review of Systems   Constitutional: Negative. HENT: Negative. Eyes: Negative. Respiratory: Negative. Cardiovascular: Positive for leg swelling. Gastrointestinal: Negative. Endocrine: Positive for cold intolerance. Genitourinary: Negative. Musculoskeletal: Negative. Skin: Negative.     Allergic/Immunologic: Positive for immunocompromised state. Neurological: Positive for numbness. Hematological: Negative. Psychiatric/Behavioral: Positive for dysphoric mood. All other systems reviewed and are negative. Objective:     Visit Vitals  /74 (BP 1 Location: Right upper arm, BP Patient Position: Sitting, BP Cuff Size: Adult)   Pulse 95   Temp 97.5 °F (36.4 °C) (Temporal)   Ht 5' 2\" (1.575 m)   Wt 265 lb (120.2 kg)   BMI 48.47 kg/m²       Physical Exam  Vitals reviewed. Constitutional:       Appearance: She is morbidly obese. Cardiovascular:      Pulses:           Dorsalis pedis pulses are 2+ on the right side and 2+ on the left side. Posterior tibial pulses are 2+ on the right side and 2+ on the left side. Pulmonary:      Effort: Pulmonary effort is normal.   Musculoskeletal:      Right lower leg: Edema present. Left lower leg: Edema present. Right foot: Normal range of motion. No deformity or bunion. Left foot: Normal range of motion. No deformity or bunion. Feet:      Right foot:      Protective Sensation: 10 sites tested. 10 sites sensed. Skin integrity: Skin integrity normal.      Toenail Condition: Right toenails are abnormally thick. Fungal disease present. Left foot:      Protective Sensation: 10 sites tested. 10 sites sensed. Skin integrity: Skin integrity normal.      Toenail Condition: Left toenails are abnormally thick. Fungal disease present. Lymphadenopathy:      Lower Body: No right inguinal adenopathy. No left inguinal adenopathy. Skin:     General: Skin is warm. Capillary Refill: Capillary refill takes 2 to 3 seconds. Findings: Rash present. Comments: Absent pedal hair growth with hyperpigmentation to the skin   Neurological:      Mental Status: She is alert and oriented to person, place, and time.       Comments: Paresthesias noted to B/L LE   Psychiatric:         Mood and Affect: Mood and affect normal.         Behavior: Behavior is cooperative. Data Review: No results found for this or any previous visit (from the past 24 hour(s)). Impression:       ICD-10-CM ICD-9-CM    1. Plantar fasciitis of left foot  M72.2 728.71 REFERRAL TO PHYSICAL THERAPY   2. Tinea pedis of both feet  B35.3 110.4        Recommendation:     Patient seen and evaluated in the office  Discussed and educated patient regarding their current medical condition. Instructed patient to monitor their feet daily, be compliant with all medications and wear supportive shoe gear. A prescription was given for a topical antifungal cream to be utilized twice daily for symptomatic relief of her tinea pedis both feet  Regarding her left heel pain, a referral was given to physical therapy to eval and treat          Reinaldo Chiu, [facility], [address] [place] [address] and [address] [facility], [address] [place] [address]  O: [phone]  F: [phone]  C: [phone]

## 2022-03-01 NOTE — PROGRESS NOTES
1. Have you been to the ER, urgent care clinic since your last visit? Hospitalized since your last visit? No    2. Have you seen or consulted any other health care providers outside of the [address] [place] [address] since your last visit? Include any pap smears or colon screening.  No     Chief Complaint   Patient presents with    Foot Swelling     L heel     Foot Pain

## 2022-03-19 PROBLEM — E11.42 DIABETIC POLYNEUROPATHY ASSOCIATED WITH TYPE 2 DIABETES MELLITUS (HCC): Status: ACTIVE | Noted: 2021-11-23

## 2022-03-19 PROBLEM — B35.3 TINEA PEDIS OF BOTH FEET: Status: ACTIVE | Noted: 2021-11-23

## 2022-03-20 PROBLEM — L60.3 ONYCHODYSTROPHY: Status: ACTIVE | Noted: 2021-11-23

## 2022-07-08 LAB — HBA1C MFR BLD HPLC: 12.1 %

## 2022-08-06 ENCOUNTER — HOSPITAL ENCOUNTER (EMERGENCY)
Age: 47
Discharge: HOME | End: 2022-08-07
Attending: STUDENT IN AN ORGANIZED HEALTH CARE EDUCATION/TRAINING PROGRAM
Payer: MEDICARE

## 2022-08-06 VITALS
DIASTOLIC BLOOD PRESSURE: 68 MMHG | HEART RATE: 68 BPM | HEIGHT: 62 IN | RESPIRATION RATE: 20 BRPM | BODY MASS INDEX: 47.53 KG/M2 | SYSTOLIC BLOOD PRESSURE: 115 MMHG | TEMPERATURE: 97.9 F | OXYGEN SATURATION: 98 % | WEIGHT: 258.3 LBS

## 2022-08-06 DIAGNOSIS — J01.00 ACUTE NON-RECURRENT MAXILLARY SINUSITIS: Primary | ICD-10-CM

## 2022-08-06 PROCEDURE — 99283 EMERGENCY DEPT VISIT LOW MDM: CPT

## 2022-08-07 RX ORDER — DOXYCYCLINE HYCLATE 100 MG
100 TABLET ORAL 2 TIMES DAILY
Qty: 14 TABLET | Refills: 0 | Status: SHIPPED | OUTPATIENT
Start: 2022-08-07 | End: 2022-08-14

## 2022-08-07 RX ORDER — PHENYLEPHRINE HCL 10 MG/1
1 TABLET, FILM COATED ORAL
Qty: 25 TABLET | Refills: 0 | Status: SHIPPED | OUTPATIENT
Start: 2022-08-07 | End: 2022-09-27 | Stop reason: ALTCHOICE

## 2022-08-07 NOTE — ED PROVIDER NOTES
EMERGENCY DEPARTMENT HISTORY AND PHYSICAL EXAM      Date: 8/6/2022  Patient Name: Lisa Hnog    History of Presenting Illness     Chief Complaint   Patient presents with    Headache    Vomiting    Nasal Congestion       History Provided By: Patient    HPI: Lisa Hong, 48 y.o. female with a past medical history significant diabetes and COPD presents to the ED with cc of sinus pain, drainage. Patient states over the last  3 to 4 days has noticed worsening sinus pain and drainage, denies any chest pain shortness of breath denies any abdominal pain nausea or vomiting. Patient has been taking nasal saline spray with minimal relief. There are no other complaints, changes, or physical findings at this time. PCP: Eloy Wu MD    No current facility-administered medications on file prior to encounter. Current Outpatient Medications on File Prior to Encounter   Medication Sig Dispense Refill    clotrimazole (LOTRIMIN) 1 % topical cream Apply  to affected area two (2) times a day. 60 g 2    azithromycin (Zithromax Z-Rufino) 250 mg tablet As instructed in the pack 6 Tablet 0    guaiFENesin-dextromethorphan SR (Mucinex DM) 600-30 mg per tablet Take 1 Tablet by mouth two (2) times a day. 12 Tablet 0    predniSONE (DELTASONE) 20 mg tablet 3 tablets for 2 days then 2 tablets for 2 days then 1 tablet for 2 day 12 Tablet 0    albuterol (PROVENTIL HFA, VENTOLIN HFA, PROAIR HFA) 90 mcg/actuation inhaler Take 1 Puff by inhalation every four (4) hours as needed for Wheezing. 6.7 g 1    albuterol-ipratropium (DUO-NEB) 2.5 mg-0.5 mg/3 ml nebu 3 mL by Nebulization route every six (6) hours as needed for Wheezing. 30 Nebule 0    insulin glargine (LANTUS,BASAGLAR) 100 unit/mL (3 mL) inpn 60 Units by SubCUTAneous route two (2) times a day. insulin lispro (HumaLOG U-100 Insulin) 100 unit/mL injection 30 Units by SubCUTAneous route Before breakfast, lunch, and dinner.       empagliflozin (Jardiance) 10 mg tablet Take  by mouth daily. medroxyPROGESTERone (Depo-Provera) 150 mg/mL injection 150 mg by IntraMUSCular route once. Last dose July 1st      buPROPion [facility]) 100 mg tablet Take 100 mg by mouth two (2) times a day. levothyroxine (Synthroid) 150 mcg tablet Take 150 mcg by mouth Daily (before breakfast). rosuvastatin (CRESTOR) 40 mg tablet Take 40 mg by mouth nightly. spironolactone (ALDACTONE) 100 mg tablet Take 100 mg by mouth daily. dexlansoprazole (Dexilant) 30 mg capsule Take 30 mg by mouth daily. linagliptin (TRADJENTA PO) Take  by mouth. Past History     Past Medical History:  Past Medical History:   Diagnosis Date    Asthma     Bipolar 1 disorder ([facility] [address]     Carpal tunnel syndrome, bilateral     Chronic obstructive pulmonary disease (HCC)     Diabetes ([facility] [address]     High cholesterol     Hypothyroidism     Kidney stones     Nicotine vapor product user        Past Surgical History:  Past Surgical History:   Procedure Laterality Date    HX CHOLECYSTECTOMY      HX OTHER SURGICAL      Uterine Ablation    HX TONSILLECTOMY      HX TUBAL LIGATION         Family History:  History reviewed. No pertinent family history. Social History:  Social History     Tobacco Use    Smoking status: Former     Packs/day: 0.25     Types: Cigarettes    Smokeless tobacco: Never   Vaping Use    Vaping Use: Never used   Substance Use Topics    Alcohol use: Not Currently    Drug use: Not Currently       Allergies: Allergies   Allergen Reactions    Latex Rash    Adhesive Rash    Aspirin Hives    Levaquin [Levofloxacin] Other (comments)     \"my arm got really hot and broke out really red the last time I had the IV version\"    Penicillins Hives    Sulfa (Sulfonamide Antibiotics) Hives         Review of Systems     Review of Systems   Constitutional:  Positive for chills and fever. Negative for activity change and appetite change. HENT:  Positive for sinus pain.  Negative for ear pain, rhinorrhea and sore throat. Eyes:  Negative for photophobia and visual disturbance. Respiratory:  Positive for cough. Negative for shortness of breath. Cardiovascular:  Negative for chest pain and palpitations. Gastrointestinal:  Negative for abdominal pain and nausea. Genitourinary:  Negative for dysuria and hematuria. Musculoskeletal:  Negative for arthralgias and myalgias. Skin:  Negative for color change and pallor. Neurological:  Negative for dizziness, weakness, numbness and headaches. Physical Exam     Physical Exam  Vitals and nursing note reviewed. Constitutional:       General: She is not in acute distress. Appearance: Normal appearance. She is obese. HENT:      Head: Normocephalic and atraumatic. Nose: Congestion present. Right Sinus: Maxillary sinus tenderness and frontal sinus tenderness present. Mouth/Throat:      Mouth: Mucous membranes are moist.      Pharynx: Oropharynx is clear. Eyes:      Extraocular Movements: Extraocular movements intact. Pupils: Pupils are equal, round, and reactive to light. Cardiovascular:      Rate and Rhythm: Normal rate and regular rhythm. Heart sounds: Normal heart sounds. Pulmonary:      Effort: Pulmonary effort is normal.      Breath sounds: Normal breath sounds. Abdominal:      General: Abdomen is flat. Musculoskeletal:         General: No swelling. Normal range of motion. Cervical back: Normal range of motion. Lymphadenopathy:      Cervical: No cervical adenopathy. Skin:     General: Skin is warm and dry. Neurological:      General: No focal deficit present. Mental Status: She is alert and oriented to person, place, and time. Mental status is at baseline. Diagnostic Study Results     Labs -   No results found for this or any previous visit (from the past 12 hour(s)).     Radiologic Studies -   @lastxrresult@  CT Results  (Last 48 hours)      None          CXR Results  (Last 48 hours)      None Medical Decision Making   I am the first provider for this patient. I reviewed the vital signs, available nursing notes, past medical history, past surgical history, family history and social history. Vital Signs-Reviewed the patient's vital signs. Patient Vitals for the past 12 hrs:   Temp Pulse Resp BP SpO2   08/06/22 2338 97.9 °F (36.6 °C) 68 20 115/68 98 %       Records Reviewed: Nursing Notes    The patient presents with sinus pain, nasal drainage with a differential diagnosis of sinuitis, rhinitis, seasonal allergies, viral URI      Provider Notes (Medical Decision Making):     MDM     68-year-old female history of diabetes, COPD, presents emergency department complaining of nasal drainage chills sinus pain. Physical exam shows well-appearing female no distress, afebrile, right maxillary sinus tender to percussion, right frontal sinus tenderness to percussion. Given sinus pain and chronicity of symptoms will treat for infectious sinusitis, discharge patient home with prescription for doxycycline, Sudafed, instructed to follow-up with primary care physician in the next 3 or 4 days. ED Course:   Initial assessment performed. The patients presenting problems have been discussed, and they are in agreement with the care plan formulated and outlined with them. I have encouraged them to ask questions as they arise throughout their visit. PROCEDURES  Procedures         PLAN:  1. Discharge Medication List as of 8/7/2022 12:45 AM        START taking these medications    Details   doxycycline (VIBRA-TABS) 100 mg tablet Take 1 Tablet by mouth two (2) times a day for 7 days. , Normal, Disp-14 Tablet, R-0      phenylephrine hcl (Sudafed PE) 10 mg tab Take 1 Tablet by mouth every six (6) hours as needed for PRN Reason (Other) (congestion). , Normal, Disp-25 Tablet, R-0           CONTINUE these medications which have NOT CHANGED    Details   clotrimazole (LOTRIMIN) 1 % topical cream Apply to affected area two (2) times a day., Normal, Disp-60 g, R-2      azithromycin (Zithromax Z-Rufino) 250 mg tablet As instructed in the pack, Normal, Disp-6 Tablet, R-0      guaiFENesin-dextromethorphan SR (Mucinex DM) 600-30 mg per tablet Take 1 Tablet by mouth two (2) times a day., Normal, Disp-12 Tablet, R-0      predniSONE (DELTASONE) 20 mg tablet 3 tablets for 2 days then 2 tablets for 2 days then 1 tablet for 2 day, Normal, Disp-12 Tablet, R-0      albuterol (PROVENTIL HFA, VENTOLIN HFA, PROAIR HFA) 90 mcg/actuation inhaler Take 1 Puff by inhalation every four (4) hours as needed for Wheezing., Normal, Disp-6.7 g, R-1      albuterol-ipratropium (DUO-NEB) 2.5 mg-0.5 mg/3 ml nebu 3 mL by Nebulization route every six (6) hours as needed for Wheezing., Normal, Disp-30 Nebule, R-0      insulin glargine (LANTUS,BASAGLAR) 100 unit/mL (3 mL) inpn 60 Units by SubCUTAneous route two (2) times a day., Historical Med      insulin lispro (HumaLOG U-100 Insulin) 100 unit/mL injection 30 Units by SubCUTAneous route Before breakfast, lunch, and dinner., Historical Med      empagliflozin (Jardiance) 10 mg tablet Take  by mouth daily. , Historical Med      medroxyPROGESTERone (Depo-Provera) 150 mg/mL injection 150 mg by IntraMUSCular route once. Last dose July 1st, Historical Med      buPROPion [facility]) 100 mg tablet Take 100 mg by mouth two (2) times a day., Historical Med      levothyroxine (Synthroid) 150 mcg tablet Take 150 mcg by mouth Daily (before breakfast). , Historical Med      rosuvastatin (CRESTOR) 40 mg tablet Take 40 mg by mouth nightly., Historical Med      spironolactone (ALDACTONE) 100 mg tablet Take 100 mg by mouth daily. , Historical Med      dexlansoprazole (Dexilant) 30 mg capsule Take 30 mg by mouth daily. , Historical Med      linagliptin (TRADJENTA PO) Take  by mouth., Historical Med           2.    Follow-up Information       Follow up With Specialties Details Why Contact Info    He[facility] Ask, MD [facility] In 3 days  [address] [place] [address] Road  [phone]            Return to ED if worse     Diagnosis     Clinical Impression:   1.  Acute non-recurrent maxillary sinusitis

## 2022-09-14 ENCOUNTER — OFFICE VISIT (OUTPATIENT)
Dept: PODIATRY | Age: 47
End: 2022-09-14
Payer: MEDICARE

## 2022-09-14 VITALS
WEIGHT: 258 LBS | SYSTOLIC BLOOD PRESSURE: 114 MMHG | HEART RATE: 88 BPM | HEIGHT: 62 IN | BODY MASS INDEX: 47.48 KG/M2 | TEMPERATURE: 81 F | DIASTOLIC BLOOD PRESSURE: 81 MMHG

## 2022-09-14 DIAGNOSIS — E11.42 DIABETIC POLYNEUROPATHY ASSOCIATED WITH TYPE 2 DIABETES MELLITUS (HCC): Primary | ICD-10-CM

## 2022-09-14 PROCEDURE — G8417 CALC BMI ABV UP PARAM F/U: HCPCS | Performed by: PODIATRIST

## 2022-09-14 PROCEDURE — G8432 DEP SCR NOT DOC, RNG: HCPCS | Performed by: PODIATRIST

## 2022-09-14 PROCEDURE — 2022F DILAT RTA XM EVC RTNOPTHY: CPT | Performed by: PODIATRIST

## 2022-09-14 PROCEDURE — G8427 DOCREV CUR MEDS BY ELIG CLIN: HCPCS | Performed by: PODIATRIST

## 2022-09-14 PROCEDURE — 3046F HEMOGLOBIN A1C LEVEL >9.0%: CPT | Performed by: PODIATRIST

## 2022-09-14 PROCEDURE — 99213 OFFICE O/P EST LOW 20 MIN: CPT | Performed by: PODIATRIST

## 2022-09-14 NOTE — PROGRESS NOTES
Chief Complaint   Patient presents with    Diabetic Foot Exam     1. \"Have you been to the ER, urgent care clinic since your last visit? Hospitalized since your last visit? \" No    2. \"Have you seen or consulted any other health care providers outside of the [address] [place] [address] since your last visit? \" No     3. For patients aged 39-70: Has the patient had a colonoscopy / FIT/ Cologuard? Yes - no Care Gap present      If the patient is female:    4. For patients aged 41-77: Has the patient had a mammogram within the past 2 years? Yes - no Care Gap present      5. For patients aged 21-65: Has the patient had a pap smear?  Yes - no Care Gap present

## 2022-09-15 ENCOUNTER — TELEPHONE (OUTPATIENT)
Dept: PODIATRY | Age: 47
End: 2022-09-15

## 2022-09-16 RX ORDER — PREGABALIN 100 MG/1
100 CAPSULE ORAL 2 TIMES DAILY
Qty: 180 CAPSULE | Refills: 0 | Status: SHIPPED | OUTPATIENT
Start: 2022-09-16

## 2022-09-27 ENCOUNTER — OFFICE VISIT (OUTPATIENT)
Dept: ENDOCRINOLOGY | Age: 47
End: 2022-09-27
Payer: MEDICARE

## 2022-09-27 VITALS
SYSTOLIC BLOOD PRESSURE: 117 MMHG | RESPIRATION RATE: 18 BRPM | HEART RATE: 85 BPM | TEMPERATURE: 97.8 F | BODY MASS INDEX: 49.98 KG/M2 | DIASTOLIC BLOOD PRESSURE: 59 MMHG | OXYGEN SATURATION: 93 % | HEIGHT: 62 IN | WEIGHT: 271.6 LBS

## 2022-09-27 DIAGNOSIS — E78.2 MIXED HYPERLIPIDEMIA: ICD-10-CM

## 2022-09-27 DIAGNOSIS — E11.65 UNCONTROLLED TYPE 2 DIABETES MELLITUS WITH HYPERGLYCEMIA (HCC): Primary | ICD-10-CM

## 2022-09-27 DIAGNOSIS — E03.4 HYPOTHYROIDISM DUE TO ACQUIRED ATROPHY OF THYROID: ICD-10-CM

## 2022-09-27 DIAGNOSIS — I10 PRIMARY HYPERTENSION: ICD-10-CM

## 2022-09-27 PROCEDURE — G8427 DOCREV CUR MEDS BY ELIG CLIN: HCPCS | Performed by: INTERNAL MEDICINE

## 2022-09-27 PROCEDURE — G8417 CALC BMI ABV UP PARAM F/U: HCPCS | Performed by: INTERNAL MEDICINE

## 2022-09-27 PROCEDURE — G8510 SCR DEP NEG, NO PLAN REQD: HCPCS | Performed by: INTERNAL MEDICINE

## 2022-09-27 PROCEDURE — 3046F HEMOGLOBIN A1C LEVEL >9.0%: CPT | Performed by: INTERNAL MEDICINE

## 2022-09-27 PROCEDURE — 99204 OFFICE O/P NEW MOD 45 MIN: CPT | Performed by: INTERNAL MEDICINE

## 2022-09-27 PROCEDURE — 2022F DILAT RTA XM EVC RTNOPTHY: CPT | Performed by: INTERNAL MEDICINE

## 2022-09-27 RX ORDER — LANCETS
EACH MISCELLANEOUS
Qty: 300 EACH | Refills: 6 | Status: SHIPPED | OUTPATIENT
Start: 2022-09-27

## 2022-09-27 RX ORDER — PEN NEEDLE, DIABETIC 31 GX3/16"
NEEDLE, DISPOSABLE MISCELLANEOUS
Qty: 10 PEN NEEDLE | Refills: 6 | Status: SHIPPED | OUTPATIENT
Start: 2022-09-27

## 2022-09-27 RX ORDER — INSULIN LISPRO 100 [IU]/ML
INJECTION, SOLUTION INTRAVENOUS; SUBCUTANEOUS
Qty: 45 ML | Refills: 6
Start: 2022-09-27

## 2022-09-27 RX ORDER — LEVOTHYROXINE SODIUM 150 UG/1
150 TABLET ORAL
Qty: 30 TABLET | Refills: 6 | Status: SHIPPED | OUTPATIENT
Start: 2022-09-27 | End: 2022-10-04 | Stop reason: SDUPTHER

## 2022-09-27 RX ORDER — INSULIN GLARGINE 100 [IU]/ML
INJECTION, SOLUTION SUBCUTANEOUS
Qty: 60 ML | Refills: 5 | Status: SHIPPED | OUTPATIENT
Start: 2022-09-27 | End: 2022-10-04 | Stop reason: SDUPTHER

## 2022-09-27 RX ORDER — SEMAGLUTIDE 1.34 MG/ML
0.25 INJECTION, SOLUTION SUBCUTANEOUS
Qty: 1.5 ML | Refills: 6 | Status: SHIPPED | OUTPATIENT
Start: 2022-09-27 | End: 2022-10-04 | Stop reason: SDUPTHER

## 2022-09-27 RX ORDER — INSULIN PUMP SYRINGE, 3 ML
EACH MISCELLANEOUS
Qty: 1 KIT | Refills: 0 | Status: SHIPPED | OUTPATIENT
Start: 2022-09-27

## 2022-09-27 NOTE — PROGRESS NOTES
1. Have you been to the ER, urgent care clinic since your last visit? No  Hospitalized since your last visit? No    2. Have you seen or consulted any other health care providers outside of the [address] [place] [address] since your last visit? Include any pap smears or colon screening.  No    Wt Readings from Last 3 Encounters:   09/27/22 271 lb 9.6 oz (123.2 kg)   09/14/22 258 lb (117 kg)   08/06/22 258 lb 4.8 oz (117.2 kg)     Temp Readings from Last 3 Encounters:   09/27/22 97.8 °F (36.6 °C) (Temporal)   09/14/22 (!) 81 °F (27.2 °C) (Temporal)   08/06/22 97.9 °F (36.6 °C)     BP Readings from Last 3 Encounters:   09/27/22 (!) 117/59   09/14/22 114/81   08/06/22 115/68     Pulse Readings from Last 3 Encounters:   09/27/22 85   09/14/22 88   08/06/22 68

## 2022-09-27 NOTE — PROGRESS NOTES
[facility]  Nazia Mcmahan MD, Tammy Alvarado is a 48 y.o. female presents today as a new DM Patient. HPI    Patient here for initial visit of Type 2 diabetes mellitus . Referred : by self/pcp    H/o diabetes for    many years   She had good control in 2018  and 2019    Slowly  a1c has been raising     She has  12 %   from July 2022   and  symptoms/problems include polyuria, polydipsia, and visual disturbances     Current diabetic medications include intensive insulin injection program. Splitting lantus and takes xigduo xr   Tradjenta   Current monitoring regimen: home blood tests - daily  Home blood sugar records: trend: fluctuating a lot  Any episodes of hypoglycemia? no    Weight trend: increasing steadily  Prior visit with dietician: no  Current diet: \"unhealthy\" diet in general  Current exercise: no regular exercise    Known diabetic complications: neuropathy   Cardiovascular risk factors: dyslipidemia, diabetes mellitus, obesity, sedentary life style, hypertension, stress    Eye exam current (within one year): no  MELISA: no     Past Medical History:   Diagnosis Date    Asthma     Bipolar 1 disorder ([facility] [address]     Carpal tunnel syndrome, bilateral     Chronic obstructive pulmonary disease (HCC)     Diabetes ([facility] [address]     High cholesterol     Hypothyroidism     Kidney stones     Nicotine vapor product user      Past Surgical History:   Procedure Laterality Date    HX CHOLECYSTECTOMY      HX OTHER SURGICAL      Uterine Ablation    HX TONSILLECTOMY      HX TUBAL LIGATION       Current Outpatient Medications   Medication Sig    dapagliflozin/metformin HCl (XIGDUO XR PO) Take 1 Tablet by mouth two (2) times a day. pregabalin (LYRICA) 100 mg capsule Take 1 Capsule by mouth two (2) times a day. Max Daily Amount: 200 mg.    clotrimazole (LOTRIMIN) 1 % topical cream Apply  to affected area two (2) times a day.     albuterol (PROVENTIL HFA, VENTOLIN HFA, PROAIR HFA) 90 mcg/actuation inhaler Take 1 Puff by inhalation every four (4) hours as needed for Wheezing. albuterol-ipratropium (DUO-NEB) 2.5 mg-0.5 mg/3 ml nebu 3 mL by Nebulization route every six (6) hours as needed for Wheezing. insulin glargine (LANTUS,BASAGLAR) 100 unit/mL (3 mL) inpn 60 Units by SubCUTAneous route three (3) times daily. insulin lispro (HUMALOG) 100 unit/mL injection 30 Units by SubCUTAneous route Before breakfast, lunch, and dinner. buPROPion (WELLBUTRIN) 100 mg tablet Take 100 mg by mouth two (2) times a day. levothyroxine (SYNTHROID) 150 mcg tablet Take 150 mcg by mouth Daily (before breakfast). Synthroid Brand    rosuvastatin (CRESTOR) 40 mg tablet Take 40 mg by mouth nightly. spironolactone (ALDACTONE) 100 mg tablet Take 100 mg by mouth daily. dexlansoprazole (DEXILANT) 30 mg capsule Take 30 mg by mouth daily. linagliptin (TRADJENTA PO) Take  by mouth.    phenylephrine hcl (Sudafed PE) 10 mg tab Take 1 Tablet by mouth every six (6) hours as needed for PRN Reason (Other) (congestion). (Patient not taking: Reported on 9/27/2022)    azithromycin (Zithromax Z-Rufino) 250 mg tablet As instructed in the pack (Patient not taking: Reported on 9/27/2022)    guaiFENesin-dextromethorphan SR (Mucinex DM) 600-30 mg per tablet Take 1 Tablet by mouth two (2) times a day. (Patient not taking: Reported on 9/27/2022)    predniSONE (DELTASONE) 20 mg tablet 3 tablets for 2 days then 2 tablets for 2 days then 1 tablet for 2 day (Patient not taking: Reported on 9/27/2022)    empagliflozin (JARDIANCE) 10 mg tablet Take  by mouth daily. (Patient not taking: Reported on 9/27/2022)    medroxyPROGESTERone (DEPO-PROVERA) 150 mg/mL injection 150 mg by IntraMUSCular route once. Last dose July 1st (Patient not taking: Reported on 9/27/2022)     No current facility-administered medications for this visit. Review of Systems   Constitutional:  Positive for malaise/fatigue and weight loss.    Eyes:  Positive for blurred vision. Genitourinary:  Positive for frequency and urgency. Vitals:    09/27/22 0835   BP: (!) 117/59   BP 1 Location: Left upper arm   BP Patient Position: Sitting   BP Cuff Size: Adult   Pulse: 85   Temp: 97.8 °F (36.6 °C)   TempSrc: Temporal   Resp: 18   Height: 5' 2\" (1.575 m)   Weight: 271 lb 9.6 oz (123.2 kg)   SpO2: 93%        Physical Exam  Constitutional:       Appearance: She is well-developed. She is obese. HENT:      Head: Normocephalic and atraumatic. Eyes:      Conjunctiva/sclera: Conjunctivae normal.      Pupils: Pupils are equal, round, and reactive to light. Cardiovascular:      Rate and Rhythm: Normal rate and regular rhythm. Pulmonary:      Effort: Pulmonary effort is normal.      Breath sounds: Normal breath sounds. Abdominal:      General: Bowel sounds are normal.      Palpations: Abdomen is soft. Musculoskeletal:         General: Normal range of motion. Cervical back: Normal range of motion and neck supple. Skin:     General: Skin is warm and dry. Comments: Has acanthosis on neck and under arms   Neurological:      Mental Status: She is alert and oriented to person, place, and time. Deep Tendon Reflexes: Reflexes are normal and symmetric. [x] Recent labs have been reviewed from referring provider. [] Recent labs were not available at time of visit. Assessment and  Plan     Type 2 DM uncontrolled : A1c is 12.1 from July 7, 2022 compared to 11.6 from April 5, 2022   compared to 10.7 from October 2021  Reviewed the glucose log : no     Discussed with patient extensively the pathophysiology of diabetes, and the need for changing the diet and encouraged her to consider dropping her soda drinking habits. Also adjusted her basal bolus regimen. I have added Ozempic to her regimen. She will continue on Xigduo XR    Patient is advised to check blood sugars 4 times daily by rotation method.   reviewed medications and side effects in detail  lab results and schedule of future lab studies reviewed with patient    specific diabetic recommendations: diabetic diet discussed in detail, written exchange diet given, low cholesterol diet, weight control and daily exercise discussed, home glucose monitoring emphasized, all medications, side effects and compliance discussed carefully, use and side effects of insulin is taught, foot care discussed and Podiatry visits discussed, annual eye examinations at Ophthalmology discussed, glycohemoglobin and other lab monitoring discussed, and long term diabetic complications discussed    2. Hypoglycemia :  Educated on treating the hypoglycemia. 3. HTN : continue current care. Patient is educated about importance of compliance with anti-hypertensives especially ARB/ACEI    4. Dyslipidemia : continue current meds. Patient is educated about benefits and adverse effects of statins and explained how benefits outweigh risk. 5. use of aspirin to prevent MI and TIA's discussed      6. Diabetic complications :     A. Retinopathy-NO   educated on this complication,  regular f/u with ophthalmologist encouraged    B. Nephropathy - NO       C. Peripheral Neuropathy -   , mild  on lyrica bid   educated on this disease and indicated improvement with good and stable glycemic control      7. Hypothyroid state  : TSH is 5.8   from July 2022- educated on proper intake ,  as she mixes her meds in AM       8. Obesity  : Body mass index is 49.68 kg/m².   TLC discussed         > 50 % of time is spent on counseling   Patient voiced understanding her plan of care

## 2022-09-27 NOTE — LETTER
9/29/2022    Patient: Devon Randall   YOB: 1975   Date of Visit: 9/27/2022     Marty Gill MD  [address] [address]  Via Fax: [phone]    Dear Marty Gill MD,      Thank you for referring Ms. Steffany Burleson to [address] [place] [address] for evaluation. My notes for this consultation are attached. If you have questions, please do not hesitate to call me. I look forward to following your patient along with you.       Sincerely,    Frances Murdock MD

## 2022-09-27 NOTE — PATIENT INSTRUCTIONS
SPECIFIC INSTRUCTIONS BELOW   We agreed to take  -   4 sugars free cans a day   ( from 6 to 8 )     DRINK water   Do not skip meals  Do not eat in between meals    Reduce carbs- pasta, rice, potatoes, bread   Try to avoid processed bread products like BISCUITS, CROISSANTS, MUFFINS    Do not drink juices or sodas, even if they are calorie zero or diet drinks and especially avoid using powders like crystalloids , SERGIO-AIDS     Do not eat peanut butter     Do not eat sugar free cookies and cakes   Do not eat peaches, oranges, pineapples, raisins, grapes , canteloupe , honey dew, mangoes , watermelon  and fruit medleys        --------------------------------------------------------------------------------------------------------------------------------    HEALTH CARE ADVOCATE         Levothyroxine 150 mcg  A day, on empty stomach with water only, no other meds or food or drinks   For next half hour   Take any kind of vitamins, calcium, iron   Pills  4 hours later    ---------------------------------------------------------------------------------------------    Start on ozempic 0.25 mg a week  ( stop tradjenta )       Stay on xigduo xr  5-1000 mg  one pill  bid       Check blood sugars immediately before each meal and at bedtime      Take  Lantus  insulin  80  units   in  AM     at bed time  ( stop one extra dose )     Take humalog or  novolog    insulin 20  units before before meals     Also, add additional humalog or novolog  as follows with meals  If blood sugars are[de-identified]    150-200 mg 0 units    201-250 mg 5 units    251-300 mg 10 units    301-350 mg 15 units    351-400 mg 20 units    401-450 mg 25 units    451-500 mg 30  units     Less than 70 mg NO INSULIN          -------------PAY ATTENTION TO THESE GENERAL INSTRUCTIONS -----------------      - The medications prescribed at this visit will not be available at pharmacy until 6 pm       - YOUR MED LIST IS NOT UP TO DATE AS SOME CHANGES ARE BEING MADE AFTER THE VISIT - FOLLOW SPECIFIC INSTRUCTIONS  ABOVE     -ANY tests other than blood work, which you opt to do  outside the  [facility] imaging facilities, you are responsible for prior authorizations if  required    - [address] UP TO DATE ON YOUR AVS- PLEASE IGNORE     Results     *Normal results will not be notified by a phone call starting January 1 2021   *If you have an upcoming visit, the results will be discussed at the visit   *Please sign up for MY CHART if you want access to your lab and test results  *Abnormal results which require immediate attention will be notified by phone call   *Abnormal results which do not require immediate assistance will be notified in 1-2 weeks       Refills    -    have your pharmacy send us a refill request . Refills are done max for one year and a visit is a must before refills are extended    Follow up appointments -  highly encourage you to make it when you are checking out. We can accommodate you into the schedule based on your clinical situation, but not for extending refills beyond a year. Labs are important to give refills and is important to get labs before the visit     Phone calls  -  Allow  24 hrs.  for non-urgent calls to be returned  Prior authorization - It may take 2-4 weeks to process  Forms  -  FMLA, DMV etc., will take up to 2 weeks to process  Cancellations - please notify the office 2 days in advance   Samples  - will only be dispensed at visits       If not showing for the appointments and cancelling appointments within 24 hours are kept track of and three  of such situations in  two consecutive years will likely be considered for termination from the practice    -------------------------------------------------------------------------------------------------------------------

## 2022-10-01 LAB
ALBUMIN SERPL-MCNC: 4.4 G/DL (ref 3.8–4.8)
ALBUMIN/CREAT UR: 51 MG/G CREAT (ref 0–29)
ALBUMIN/GLOB SERPL: 2.1 {RATIO} (ref 1.2–2.2)
ALP SERPL-CCNC: 137 IU/L (ref 44–121)
ALT SERPL-CCNC: 21 IU/L (ref 0–32)
AST SERPL-CCNC: 17 IU/L (ref 0–40)
BILIRUB SERPL-MCNC: 0.2 MG/DL (ref 0–1.2)
BUN SERPL-MCNC: 17 MG/DL (ref 6–24)
BUN/CREAT SERPL: 30 (ref 9–23)
C PEPTIDE SERPL-MCNC: 4.1 NG/ML (ref 1.1–4.4)
CALCIUM SERPL-MCNC: 9.7 MG/DL (ref 8.7–10.2)
CHLORIDE SERPL-SCNC: 104 MMOL/L (ref 96–106)
CHOLEST SERPL-MCNC: 147 MG/DL (ref 100–199)
CO2 SERPL-SCNC: 19 MMOL/L (ref 20–29)
CREAT SERPL-MCNC: 0.57 MG/DL (ref 0.57–1)
CREAT UR-MCNC: 28.4 MG/DL
EGFR: 112 ML/MIN/1.73
EST. AVERAGE GLUCOSE BLD GHB EST-MCNC: 315 MG/DL
FSH SERPL-ACNC: 48.3 MIU/ML
GAD65 AB SER IA-ACNC: <5 U/ML (ref 0–5)
GLOBULIN SER CALC-MCNC: 2.1 G/DL (ref 1.5–4.5)
GLUCOSE SERPL-MCNC: 189 MG/DL (ref 70–99)
HBA1C MFR BLD: 12.6 % (ref 4.8–5.6)
HDLC SERPL-MCNC: 33 MG/DL
IMP & REVIEW OF LAB RESULTS: NORMAL
LDLC SERPL CALC-MCNC: 63 MG/DL (ref 0–99)
LH SERPL-ACNC: 34.7 MIU/ML
MICROALBUMIN UR-MCNC: 14.6 UG/ML
POTASSIUM SERPL-SCNC: 4.1 MMOL/L (ref 3.5–5.2)
PROT SERPL-MCNC: 6.5 G/DL (ref 6–8.5)
SODIUM SERPL-SCNC: 141 MMOL/L (ref 134–144)
TRIGL SERPL-MCNC: 325 MG/DL (ref 0–149)
TSH SERPL DL<=0.005 MIU/L-ACNC: 9.44 UIU/ML (ref 0.45–4.5)
VLDLC SERPL CALC-MCNC: 51 MG/DL (ref 5–40)

## 2022-10-04 DIAGNOSIS — E03.4 HYPOTHYROIDISM DUE TO ACQUIRED ATROPHY OF THYROID: ICD-10-CM

## 2022-10-04 DIAGNOSIS — E11.65 UNCONTROLLED TYPE 2 DIABETES MELLITUS WITH HYPERGLYCEMIA (HCC): ICD-10-CM

## 2022-10-04 RX ORDER — LEVOTHYROXINE SODIUM 150 UG/1
150 TABLET ORAL
Qty: 90 TABLET | Refills: 3 | Status: SHIPPED | OUTPATIENT
Start: 2022-10-04

## 2022-10-04 RX ORDER — SEMAGLUTIDE 1.34 MG/ML
0.25 INJECTION, SOLUTION SUBCUTANEOUS
Qty: 4.5 ML | Refills: 3 | Status: SHIPPED | OUTPATIENT
Start: 2022-10-04

## 2022-10-04 RX ORDER — INSULIN GLARGINE 100 [IU]/ML
INJECTION, SOLUTION SUBCUTANEOUS
Qty: 150 ML | Refills: 3 | Status: SHIPPED | OUTPATIENT
Start: 2022-10-04

## 2022-10-04 RX ORDER — INSULIN LISPRO 100 [IU]/ML
INJECTION, SOLUTION INTRAVENOUS; SUBCUTANEOUS
Qty: 135 ML | Refills: 3 | Status: SHIPPED | OUTPATIENT
Start: 2022-10-04

## 2022-10-04 RX ORDER — DAPAGLIFLOZIN AND METFORMIN HYDROCHLORIDE 5; 1000 MG/1; MG/1
1 TABLET, FILM COATED, EXTENDED RELEASE ORAL 2 TIMES DAILY
Qty: 180 TABLET | Refills: 3 | Status: SHIPPED | OUTPATIENT
Start: 2022-10-04

## 2022-10-18 ENCOUNTER — TELEPHONE (OUTPATIENT)
Dept: ENDOCRINOLOGY | Age: 47
End: 2022-10-18

## 2022-10-18 NOTE — TELEPHONE ENCOUNTER
Informed pt her patient assistance for lantus has arrived at the office and is ready for . Pt verbalized understanding with no further questions or concerns at this time.

## 2023-01-12 ENCOUNTER — TELEPHONE (OUTPATIENT)
Dept: ENDOCRINOLOGY | Age: 48
End: 2023-01-12

## 2023-01-12 NOTE — TELEPHONE ENCOUNTER
Call  [employer] . To see what the issue is with Ozempic. Her advocate called and they told her the physician office has to call.  Please call patient to advise

## 2023-01-16 NOTE — TELEPHONE ENCOUNTER
Called patient and left her a voicemail advising of the followin-As previously discussed [facility] was called and the call was not answered after a 1 hour hold. 2-A return call was requested via automated system but no return call has been received  3-There is Ozempic here at the office to be picked up from [facility] as previously discussed  4-Patient can call to verify the address that the [address] was shipped to in October and request any paperwork needed for that shipment to be replaced since it was not received to be faxed to the office for Dr Yonas Roe to complete.

## 2023-01-17 ENCOUNTER — OFFICE VISIT (OUTPATIENT)
Dept: PODIATRY | Age: 48
End: 2023-01-17
Payer: MEDICARE

## 2023-01-17 VITALS
RESPIRATION RATE: 16 BRPM | BODY MASS INDEX: 50.81 KG/M2 | DIASTOLIC BLOOD PRESSURE: 78 MMHG | WEIGHT: 276.13 LBS | SYSTOLIC BLOOD PRESSURE: 117 MMHG | TEMPERATURE: 98.1 F | HEIGHT: 62 IN | HEART RATE: 81 BPM

## 2023-01-17 DIAGNOSIS — L60.3 ONYCHODYSTROPHY: ICD-10-CM

## 2023-01-17 DIAGNOSIS — E11.65 POORLY CONTROLLED DIABETES MELLITUS (HCC): ICD-10-CM

## 2023-01-17 DIAGNOSIS — E11.42 DIABETIC POLYNEUROPATHY ASSOCIATED WITH TYPE 2 DIABETES MELLITUS (HCC): ICD-10-CM

## 2023-01-17 DIAGNOSIS — L40.9 PSORIASIS: ICD-10-CM

## 2023-01-17 DIAGNOSIS — B35.3 TINEA PEDIS OF BOTH FEET: Primary | ICD-10-CM

## 2023-01-17 PROCEDURE — 3046F HEMOGLOBIN A1C LEVEL >9.0%: CPT | Performed by: PODIATRIST

## 2023-01-17 PROCEDURE — G8432 DEP SCR NOT DOC, RNG: HCPCS | Performed by: PODIATRIST

## 2023-01-17 PROCEDURE — 11721 DEBRIDE NAIL 6 OR MORE: CPT | Performed by: PODIATRIST

## 2023-01-17 PROCEDURE — G8427 DOCREV CUR MEDS BY ELIG CLIN: HCPCS | Performed by: PODIATRIST

## 2023-01-17 PROCEDURE — 2022F DILAT RTA XM EVC RTNOPTHY: CPT | Performed by: PODIATRIST

## 2023-01-17 PROCEDURE — 99214 OFFICE O/P EST MOD 30 MIN: CPT | Performed by: PODIATRIST

## 2023-01-17 PROCEDURE — G8417 CALC BMI ABV UP PARAM F/U: HCPCS | Performed by: PODIATRIST

## 2023-01-17 RX ORDER — PREGABALIN 100 MG/1
100 CAPSULE ORAL 2 TIMES DAILY
Qty: 180 CAPSULE | Refills: 0 | Status: SHIPPED | OUTPATIENT
Start: 2023-01-17

## 2023-01-17 RX ORDER — AMMONIUM LACTATE 12 G/100G
CREAM TOPICAL 2 TIMES DAILY
Qty: 280 G | Refills: 2 | Status: SHIPPED | OUTPATIENT
Start: 2023-01-17

## 2023-01-17 RX ORDER — CLOTRIMAZOLE 1 %
CREAM (GRAM) TOPICAL 2 TIMES DAILY
Qty: 60 G | Refills: 2 | Status: SHIPPED | OUTPATIENT
Start: 2023-01-17

## 2023-01-17 NOTE — PROGRESS NOTES
Visit Vitals  /78 (BP 1 Location: Left upper arm, BP Patient Position: Sitting, BP Cuff Size: Adult)   Pulse 81   Temp 98.1 °F (36.7 °C)   Resp 16   Ht 5' 2\" (1.575 m)   Wt 276 lb 2 oz (125.2 kg)   BMI 50.50 kg/m²       Chief Complaint   Patient presents with    Follow-up     Foot care

## 2023-01-19 NOTE — PROGRESS NOTES
[facility]    Subjective:         Patient is a 50 y.o. female who is being seen as a returning pt for diabetic pedal exam with multiple lower extremity complaints. Patient states she has close follow-up with her endocrinologist (Dr. Rochelle Pizarro). She states her last office visit with her endocrinologist was 09/27/2022. She describes her diabetes as being out of control, noting her last hemoglobin A1c was 12.6%. She states she continues to have a rash to both feet. She states the prescription for the topical antifungal has helped and she does need a refill. She denies any overt pedal pain. She denies any recent trauma. She denies any skin or systemic signs of infection but states her toenails continue to be thick/discolored. She denies any recent change in her shoe gear or activity level. She denies any other pedal complaints      Past Medical History:   Diagnosis Date    Asthma     Bipolar 1 disorder ([address]     Carpal tunnel syndrome, bilateral     Chronic obstructive pulmonary disease (HCC)     Diabetes (HCC)     High cholesterol     Hypothyroidism     Kidney stones     Nicotine vapor product user      Past Surgical History:   Procedure Laterality Date    HX CHOLECYSTECTOMY      HX OTHER SURGICAL      Uterine Ablation    HX TONSILLECTOMY      HX TUBAL LIGATION         No family history on file. Social History     Tobacco Use    Smoking status: Former     Packs/day: 0.25     Types: Cigarettes    Smokeless tobacco: Never   Substance Use Topics    Alcohol use: Not Currently     Allergies   Allergen Reactions    Latex Rash    Adhesive Rash    Aspirin Hives    Levaquin [Levofloxacin] Other (comments)     \"my arm got really hot and broke out really red the last time I had the IV version\"    Penicillins Hives    Sulfa (Sulfonamide Antibiotics) Hives     Prior to Admission medications    Medication Sig Start Date End Date Taking?  Authorizing Provider   pregabalin (LYRICA) 100 mg capsule Take 1 Capsule by mouth two (2) times a day. Max Daily Amount: 200 mg. 1/17/23  Yes Jane Morris DPM   clotrimazole (LOTRIMIN) 1 % topical cream Apply  to affected area two (2) times a day. 1/17/23  Yes Jane Morris DPM   ammonium lactate (AMLACTIN) 12 % topical cream Apply  to affected area two (2) times a day. rub in to affected area well 1/17/23  Yes Jane Morris DPM   dapagliflozin-metformin (Xigduo XR) 5-1,000 mg TBph Take 1 Tablet by mouth two (2) times a day. 10/4/22   Fer Washington MD   insulin glargine (LANTUS,BASAGLAR) 100 unit/mL (3 mL) inpn Inject 80 units subcutaneously in the AM and at bedtime 10/4/22   Fer Washington MD   levothyroxine (SYNTHROID) 150 mcg tablet Take 1 Tablet by mouth Daily (before breakfast). Synthroid Brand 10/4/22   Fer Washington MD   semaglutide (Ozempic) 0.25 mg or 0.5 mg/dose (2 mg/1.5 ml) subq pen 0.25 mg by SubCUTAneous route every seven (7) days.  10/4/22   Fer Washington MD   insulin lispro (HumaLOG KwikPen Insulin) 100 unit/mL kwikpen Inject 20 units subcutaneously before each meal plus sliding scale for a max daily dose of 150 units 10/4/22   Fer Washington MD   insulin lispro (HUMALOG) 100 unit/mL injection Inject 20 units subcutaneously before each meal plus sliding scale for a max daily dose of 150 units 9/27/22   Fer Washington MD   Insulin Needles, Disposable, (ReliOn Pen Needles) 32 gauge x 5/32\" ndle Use to inject medication subcutaneously once weekly DX E11.65 9/27/22   Fer Washington MD   Blood-Glucose Meter monitoring kit Use to check blood glucose level 3 times daily DX E11.65 9/27/22   Fer Washington MD   glucose blood VI test strips strip Use to check blood glucose level 3 times daily DX E11.65 9/27/22   Fer Washington MD   lancets misc Use to check blood glucose level 3 times daily DX E11.65 9/27/22   Fer Washington MD   albuterol (PROVENTIL HFA, VENTOLIN HFA, PROAIR HFA) 90 mcg/actuation inhaler Take 1 Puff by inhalation every four (4) hours as needed for Wheezing. 1/7/22   Ileana Fothergill, MD   albuterol-ipratropium (DUO-NEB) 2.5 mg-0.5 mg/3 ml nebu 3 mL by Nebulization route every six (6) hours as needed for Wheezing. 1/7/22   Ileana Fothergill, MD   buPROPion (WELLBUTRIN) 100 mg tablet Take 100 mg by mouth two (2) times a day. Eleuterio Martínez MD   rosuvastatin (CRESTOR) 40 mg tablet Take 40 mg by mouth nightly. Eleuterio Martínez MD   spironolactone (ALDACTONE) 100 mg tablet Take 100 mg by mouth daily. Eleuterio Martínez MD   dexlansoprazole (DEXILANT) 30 mg capsule Take 30 mg by mouth daily. Eleuterio Martínez MD       Review of Systems   Constitutional: Negative. HENT: Negative. Eyes: Negative. Respiratory: Negative. Cardiovascular:  Positive for leg swelling. Gastrointestinal: Negative. Endocrine: Positive for cold intolerance. Genitourinary: Negative. Musculoskeletal: Negative. Skin: Negative. Allergic/Immunologic: Positive for immunocompromised state. Neurological:  Positive for numbness. Hematological: Negative. Psychiatric/Behavioral:  Positive for dysphoric mood. All other systems reviewed and are negative. Objective:     Visit Vitals  /78 (BP 1 Location: Left upper arm, BP Patient Position: Sitting, BP Cuff Size: Adult)   Pulse 81   Temp 98.1 °F (36.7 °C)   Resp 16   Ht 5' 2\" (1.575 m)   Wt 276 lb 2 oz (125.2 kg)   BMI 50.50 kg/m²       Physical Exam  Vitals reviewed. Constitutional:       Appearance: She is morbidly obese. Cardiovascular:      Pulses:           Dorsalis pedis pulses are 2+ on the right side and 2+ on the left side. Posterior tibial pulses are 2+ on the right side and 2+ on the left side. Pulmonary:      Effort: Pulmonary effort is normal.   Musculoskeletal:      Right lower leg: Edema present. Left lower leg: Edema present. Right foot: Normal range of motion. No deformity or bunion. Left foot: Normal range of motion.  No deformity or noelleion. Feet:      Right foot:      Protective Sensation: 10 sites tested. 10 sites sensed. Skin integrity: Skin integrity normal.      Toenail Condition: Right toenails are abnormally thick. Fungal disease present. Left foot:      Protective Sensation: 10 sites tested. 10 sites sensed. Skin integrity: Skin integrity normal.      Toenail Condition: Left toenails are abnormally thick. Fungal disease present. Lymphadenopathy:      Lower Body: No right inguinal adenopathy. No left inguinal adenopathy. Skin:     General: Skin is warm. Capillary Refill: Capillary refill takes 2 to 3 seconds. Findings: Rash present. Comments: Absent pedal hair growth with hyperpigmentation to the skin   Neurological:      Mental Status: She is alert and oriented to person, place, and time. Comments: Paresthesias noted to B/L LE   Psychiatric:         Mood and Affect: Mood and affect normal.         Behavior: Behavior is cooperative. Data Review: No results found for this or any previous visit (from the past 24 hour(s)). Impression:       ICD-10-CM ICD-9-CM    1. Tinea pedis of both feet  B35.3 110.4       2. Onychodystrophy  L60.3 703.8       3. Poorly controlled diabetes mellitus ([facility] [address]  E11.65 250.00       4. Diabetic polyneuropathy associated with type 2 diabetes mellitus ([facility])  E11.42 250.60 pregabalin (LYRICA) 100 mg capsule     357.2       5. Psoriasis  L40.9 696.1           Recommendation:     Patient seen and evaluated in the office  Discussed and educated patient regarding his/her current medical condition as it pertains to his/her diabetes and his/her thick/discolored toenails. Instructed patient to monitor his/her feet daily for possible wound formation, be compliant with all medications and wear supportive shoe gear for wound prevention. Reviewed and discussed most recent lab work, specifically as it pertains to his/her diabetes.    A sharp toenail plate debridement was performed to all 10 pedal digits with a nail nipper without incident. Patient tolerated well no dressing was needed  A prescription was provided for ammonium lactate 12% cream to be applied twice daily to all affected dystrophic toenails and psoriatic skin  A refill prescription was given for a topical antifungal cream to be utilized twice daily for symptomatic relief of her tinea pedis both feet  3, discussed her diabetic peripheral neuropathy. Treatment options reviewed. A prescription was provided for Lyrica 100 mg to be taken p.o. twice daily for symptomatic relief  Pt verbalized understanding of all discussed and reviewed          Reinaldo Morris, LILLIAM, [facility], [address] [place] [address] and Foot Surgery  [address] [place] [address]  O: [phone]  F: [phone]    * [employer] available 24/7

## 2023-01-21 ENCOUNTER — APPOINTMENT (OUTPATIENT)
Dept: GENERAL RADIOLOGY | Age: 48
End: 2023-01-21
Attending: EMERGENCY MEDICINE
Payer: MEDICARE

## 2023-01-21 ENCOUNTER — HOSPITAL ENCOUNTER (EMERGENCY)
Age: 48
Discharge: HOME | End: 2023-01-21
Attending: EMERGENCY MEDICINE
Payer: MEDICARE

## 2023-01-21 VITALS
WEIGHT: 271 LBS | BODY MASS INDEX: 49.87 KG/M2 | HEART RATE: 89 BPM | DIASTOLIC BLOOD PRESSURE: 75 MMHG | OXYGEN SATURATION: 96 % | HEIGHT: 62 IN | SYSTOLIC BLOOD PRESSURE: 128 MMHG | TEMPERATURE: 97.5 F | RESPIRATION RATE: 20 BRPM

## 2023-01-21 DIAGNOSIS — J20.9 ACUTE BRONCHITIS, UNSPECIFIED ORGANISM: Primary | ICD-10-CM

## 2023-01-21 LAB
FLUAV AG NPH QL IA: NEGATIVE
FLUBV AG NOSE QL IA: NEGATIVE
SARS-COV-2, COV2: NORMAL

## 2023-01-21 PROCEDURE — 71045 X-RAY EXAM CHEST 1 VIEW: CPT

## 2023-01-21 PROCEDURE — 87804 INFLUENZA ASSAY W/OPTIC: CPT

## 2023-01-21 PROCEDURE — 74011250637 HC RX REV CODE- 250/637: Performed by: EMERGENCY MEDICINE

## 2023-01-21 PROCEDURE — U0005 INFEC AGEN DETEC AMPLI PROBE: HCPCS

## 2023-01-21 PROCEDURE — 74011636637 HC RX REV CODE- 636/637: Performed by: EMERGENCY MEDICINE

## 2023-01-21 PROCEDURE — 99284 EMERGENCY DEPT VISIT MOD MDM: CPT

## 2023-01-21 RX ORDER — ALBUTEROL SULFATE 90 UG/1
1 AEROSOL, METERED RESPIRATORY (INHALATION)
Qty: 6.7 G | Refills: 1 | Status: SHIPPED | OUTPATIENT
Start: 2023-01-21

## 2023-01-21 RX ORDER — IPRATROPIUM BROMIDE AND ALBUTEROL SULFATE 2.5; .5 MG/3ML; MG/3ML
3 SOLUTION RESPIRATORY (INHALATION)
Qty: 40 EACH | Refills: 1 | Status: SHIPPED | OUTPATIENT
Start: 2023-01-21 | End: 2023-01-31

## 2023-01-21 RX ORDER — BUSPIRONE HYDROCHLORIDE 7.5 MG/1
7.5 TABLET ORAL 3 TIMES DAILY
COMMUNITY

## 2023-01-21 RX ORDER — PREDNISONE 20 MG/1
60 TABLET ORAL ONCE
Status: COMPLETED | OUTPATIENT
Start: 2023-01-22 | End: 2023-01-21

## 2023-01-21 RX ORDER — PREDNISONE 20 MG/1
TABLET ORAL
Qty: 12 TABLET | Refills: 0 | Status: SHIPPED | OUTPATIENT
Start: 2023-01-21

## 2023-01-21 RX ORDER — AZITHROMYCIN 250 MG/1
TABLET, FILM COATED ORAL
Qty: 6 TABLET | Refills: 0 | Status: SHIPPED | OUTPATIENT
Start: 2023-01-21

## 2023-01-21 RX ORDER — AZITHROMYCIN 250 MG/1
500 TABLET, FILM COATED ORAL
Status: COMPLETED | OUTPATIENT
Start: 2023-01-22 | End: 2023-01-21

## 2023-01-21 RX ORDER — ARIPIPRAZOLE 10 MG/1
10 TABLET ORAL DAILY
COMMUNITY

## 2023-01-21 RX ADMIN — PREDNISONE 60 MG: 20 TABLET ORAL at 23:43

## 2023-01-21 RX ADMIN — AZITHROMYCIN MONOHYDRATE 500 MG: 250 TABLET ORAL at 23:43

## 2023-01-21 NOTE — Clinical Note
[address]  [address] [facility] [address]  [phone]    Work/School Note    Date: 1/21/2023    To Whom It May concern:    Eric Anguiano was seen and treated today in the emergency room by the following provider(s):  Attending Provider: Abdiel Moya MD.      Eric Anguiano is excused from work/school on 1/21/2023 through 1/23/2023. She is medically clear to return to work/school on 1/24/2023.          Sincerely,          Mariana Sandifer, MD

## 2023-01-22 LAB
SARS-COV-2, XPLCVT: NOT DETECTED
SOURCE, COVRS: NORMAL

## 2023-01-22 NOTE — ED PROVIDER NOTES
[facility] DEPARTMENT HISTORY AND PHYSICAL EXAM      Date: 1/21/2023  Patient Name: Steven Phan    History of Presenting Illness     Chief Complaint   Patient presents with    Cold Symptoms       History Provided By: Patient    HPI: Steven Phan, 50 y.o. female   presents to the ED with cc of cough. Patient complains of intermittent episode of nonproductive cough for last 3 days. Breath or chest pain. Patient also complains of nasal congestion with postnasal drip with a mild sore throat. Low-grade temperature of 99.8 at home today. No vomiting or diarrhea. Patient has received COVID vaccination. No flu vaccination. Normal p.o. intake with normal urination. Patient has been compliant with her insulin regimen for diabetes. PCP: Nicolas Hobson MD    No current facility-administered medications on file prior to encounter. Current Outpatient Medications on File Prior to Encounter   Medication Sig Dispense Refill    ARIPiprazole (Abilify) 10 mg tablet Take 10 mg by mouth daily. busPIRone (BUSPAR) 7.5 mg tablet Take 7.5 mg by mouth three (3) times daily. pregabalin (LYRICA) 100 mg capsule Take 1 Capsule by mouth two (2) times a day. Max Daily Amount: 200 mg. 180 Capsule 0    clotrimazole (LOTRIMIN) 1 % topical cream Apply  to affected area two (2) times a day. 60 g 2    ammonium lactate (AMLACTIN) 12 % topical cream Apply  to affected area two (2) times a day. rub in to affected area well 280 g 2    dapagliflozin-metformin (Xigduo XR) 5-1,000 mg TBph Take 1 Tablet by mouth two (2) times a day. 180 Tablet 3    insulin glargine (LANTUS,BASAGLAR) 100 unit/mL (3 mL) inpn Inject 80 units subcutaneously in the AM and at bedtime 150 mL 3    levothyroxine (SYNTHROID) 150 mcg tablet Take 1 Tablet by mouth Daily (before breakfast). Synthroid Brand 90 Tablet 3    semaglutide (Ozempic) 0.25 mg or 0.5 mg/dose (2 mg/1.5 ml) subq pen 0.25 mg by SubCUTAneous route every seven (7) days.  4.5 mL 3    insulin lispro (HumaLOG KwikPen Insulin) 100 unit/mL kwikpen Inject 20 units subcutaneously before each meal plus sliding scale for a max daily dose of 150 units 135 mL 3    insulin lispro (HUMALOG) 100 unit/mL injection Inject 20 units subcutaneously before each meal plus sliding scale for a max daily dose of 150 units 45 mL 6    Insulin Needles, Disposable, (ReliOn Pen Needles) 32 gauge x 5/32\" ndle Use to inject medication subcutaneously once weekly DX E11.65 10 Pen Needle 6    Blood-Glucose Meter monitoring kit Use to check blood glucose level 3 times daily DX E11.65 1 Kit 0    glucose blood VI test strips strip Use to check blood glucose level 3 times daily DX E11.65 300 Strip 6    lancets misc Use to check blood glucose level 3 times daily DX E11.65 300 Each 6    albuterol (PROVENTIL HFA, VENTOLIN HFA, PROAIR HFA) 90 mcg/actuation inhaler Take 1 Puff by inhalation every four (4) hours as needed for Wheezing. 6.7 g 1    albuterol-ipratropium (DUO-NEB) 2.5 mg-0.5 mg/3 ml nebu 3 mL by Nebulization route every six (6) hours as needed for Wheezing. 30 Nebule 0    buPROPion (WELLBUTRIN) 100 mg tablet Take 100 mg by mouth two (2) times a day. rosuvastatin (CRESTOR) 40 mg tablet Take 40 mg by mouth nightly. spironolactone (ALDACTONE) 100 mg tablet Take 100 mg by mouth daily. dexlansoprazole (DEXILANT) 30 mg capsule Take 30 mg by mouth daily. Past History     Past Medical History:  Past Medical History:   Diagnosis Date    Asthma     Bipolar 1 disorder ([facility] [address]     Carpal tunnel syndrome, bilateral     Chronic obstructive pulmonary disease (HCC)     Diabetes ([facility] [address]     High cholesterol     Hypothyroidism     Kidney stones     Nicotine vapor product user        Past Surgical History:  Past Surgical History:   Procedure Laterality Date    HX CHOLECYSTECTOMY      HX OTHER SURGICAL      Uterine Ablation    HX TONSILLECTOMY      HX TUBAL LIGATION         Family History:  History reviewed.  No pertinent family history. Social History:  Social History     Tobacco Use    Smoking status: Former     Packs/day: 0.25     Types: Cigarettes    Smokeless tobacco: Never   Vaping Use    Vaping Use: Never used   Substance Use Topics    Alcohol use: Not Currently    Drug use: Not Currently       Allergies: Allergies   Allergen Reactions    Latex Rash    Adhesive Rash    Aspirin Hives    Levaquin [Levofloxacin] Other (comments)     \"my arm got really hot and broke out really red the last time I had the IV version\"    Penicillins Hives    Sulfa (Sulfonamide Antibiotics) Hives         Review of Systems   Review of Systems   Constitutional:  Negative for chills and fever. HENT:  Positive for rhinorrhea and sore throat. Eyes:  Negative for discharge. Respiratory:  Positive for cough. Negative for shortness of breath. Cardiovascular:  Negative for chest pain. Gastrointestinal:  Negative for abdominal pain and vomiting. Genitourinary:  Negative for dysuria. Musculoskeletal:  Negative for joint swelling. Skin:  Negative for rash. Neurological:  Negative for headaches. Psychiatric/Behavioral:  Negative for suicidal ideas. All other systems reviewed and are negative. Physical Exam   Physical Exam  Vitals and nursing note reviewed. Constitutional:       General: She is not in acute distress. Appearance: Normal appearance. She is well-developed. She is not ill-appearing, toxic-appearing or diaphoretic. HENT:      Head: Normocephalic and atraumatic. Nose: Congestion present. Mouth/Throat:      Mouth: Mucous membranes are moist.   Eyes:      Conjunctiva/sclera: Conjunctivae normal.   Cardiovascular:      Rate and Rhythm: Regular rhythm. Heart sounds: Normal heart sounds. Pulmonary:      Effort: Pulmonary effort is normal. No respiratory distress. Breath sounds: No stridor. Rhonchi present. No wheezing or rales.    Abdominal:      General: Bowel sounds are normal. Palpations: Abdomen is soft. Tenderness: There is no abdominal tenderness. There is no guarding or rebound. Musculoskeletal:         General: No deformity. Cervical back: Neck supple. Skin:     General: Skin is warm and dry. Neurological:      General: No focal deficit present. Mental Status: She is alert. Psychiatric:         Mood and Affect: Mood normal.       Diagnostic Study Results     Labs -     Recent Results (from the past 12 hour(s))   SARS-COV-2    Collection Time: 01/21/23 11:00 PM   Result Value Ref Range    SARS-CoV-2 by PCR Please find results under separate order     INFLUENZA A & B AG (RAPID TEST)    Collection Time: 01/21/23 11:00 PM   Result Value Ref Range    Influenza A Antigen Negative Negative      Influenza B Antigen Negative Negative         Radiologic Studies -   XR CHEST PORT   Final Result   Mild airspace disease at the left lung base. CT Results  (Last 48 hours)      None          CXR Results  (Last 48 hours)                 01/21/23 2313  XR CHEST PORT Final result    Impression:  Mild airspace disease at the left lung base. Narrative:  Clinical history: cough   INDICATION:   cough   COMPARISON: None       FINDINGS:   AP portable upright view of the chest demonstrates a stable  cardiopericardial   silhouette. There is no pleural effusion. .Minimal left basilar parenchymal   opacity. .There is no pneumothorax. . Patient is on a cardiac monitor. Medical Decision Making   I am the first provider for this patient. I reviewed the vital signs, available nursing notes, past medical history, past surgical history, family history and social history. Vital Signs-Reviewed the patient's vital signs.   Patient Vitals for the past 12 hrs:   Temp Pulse Resp BP SpO2   01/21/23 2253 97.5 °F (36.4 °C) 89 20 128/75 96 %       Records Reviewed:     Provider Notes (Medical Decision Making):   Patient presents with a symptoms signs suggestive of bronchitis. Patient is nontoxic-appearing and not in respiratory distress with normal O2 saturation. Lung sounds with few scattered rhonchi. Chest x-ray with minimal left basilar parenchymal opacity. Not febrile and nontoxic-appearing. Well-hydrated. Patient was treated for bronchitis perhaps with an early pneumonia with a p.o. Zithromax, albuterol inhaler as needed, and prednisone. Patient has taken prednisone multiple times in past and aware that her glucose will be elevated during the steroid treatment. Patient was discharged in stable condition. Return precaution given to the patient. ED Course:   Initial assessment performed. The patients presenting problems have been discussed, and they are in agreement with the care plan formulated and outlined with them. I have encouraged them to ask questions as they arise throughout their visit. No respiratory distress. PROCEDURES      Disposition: Condition stable   DC- Adult Discharges: All of the diagnostic tests were reviewed and questions answered. Diagnosis, care plan and treatment options were discussed. understand instructions and will follow up as directed. The patients results have been reviewed with them. They have been counseled regarding their diagnosis. The patient verbally convey understanding and agreement of the signs, symptoms, diagnosis, treatment and prognosis and additionally agrees to follow up as recommended. They also agree with the care-plan and convey that all of their questions have been answered. I have also put together some discharge instructions for them that include: 1) educational information regarding their diagnosis, 2) how to care for their diagnosis at home, as well a 3) list of reasons why they would want to return to the ED prior to their follow-up appointment, should their condition change. PLAN:  1.    Discharge Medication List as of 1/21/2023 11:40 PM        START taking these medications Details   !! albuterol (PROVENTIL HFA, VENTOLIN HFA, PROAIR HFA) 90 mcg/actuation inhaler Take 1 Puff by inhalation every four (4) hours as needed for Wheezing., Normal, Disp-6.7 g, R-1      !! albuterol-ipratropium (DUO-NEB) 2.5 mg-0.5 mg/3 ml nebu 3 mL by Nebulization route every six (6) hours as needed for Wheezing for up to 10 days. , Normal, Disp-40 Each, R-1      azithromycin (Zithromax Z-Rufino) 250 mg tablet As instructed in the pack, Normal, Disp-6 Tablet, R-0      predniSONE (DELTASONE) 20 mg tablet 3 tablets for 2 days then 2 tablets for 2 days then 1 tablet for 2 day, Normal, Disp-12 Tablet, R-0      guaiFENesin-dextromethorphan SR (Mucinex DM) 600-30 mg per tablet Take 1 Tablet by mouth two (2) times a day., Normal, Disp-12 Tablet, R-0       !! - Potential duplicate medications found. Please discuss with provider. CONTINUE these medications which have NOT CHANGED    Details   ARIPiprazole (Abilify) 10 mg tablet Take 10 mg by mouth daily. , Historical Med      busPIRone (BUSPAR) 7.5 mg tablet Take 7.5 mg by mouth three (3) times daily. , Historical Med      pregabalin (LYRICA) 100 mg capsule Take 1 Capsule by mouth two (2) times a day. Max Daily Amount: 200 mg., Normal, Disp-180 Capsule, R-0      clotrimazole (LOTRIMIN) 1 % topical cream Apply  to affected area two (2) times a day., Normal, Disp-60 g, R-2      ammonium lactate (AMLACTIN) 12 % topical cream Apply  to affected area two (2) times a day. rub in to affected area well, Normal, Disp-280 g, R-2      dapagliflozin-metformin (Xigduo XR) 5-1,000 mg TBph Take 1 Tablet by mouth two (2) times a day., Print, Disp-180 Tablet, R-3      insulin glargine (LANTUS,BASAGLAR) 100 unit/mL (3 mL) inpn Inject 80 units subcutaneously in the AM and at bedtime, Print, Disp-150 mL, R-3      levothyroxine (SYNTHROID) 150 mcg tablet Take 1 Tablet by mouth Daily (before breakfast).  Synthroid Brand, Print, Disp-90 Tablet, R-3      semaglutide (Ozempic) 0.25 mg or 0.5 mg/dose (2 mg/1.5 ml) subq pen 0.25 mg by SubCUTAneous route every seven (7) days. , Print, Disp-4.5 mL, R-3STOP TRADJENTA      insulin lispro (HumaLOG KwikPen Insulin) 100 unit/mL kwikpen Inject 20 units subcutaneously before each meal plus sliding scale for a max daily dose of 150 units, Print, Disp-135 mL, R-3      insulin lispro (HUMALOG) 100 unit/mL injection Inject 20 units subcutaneously before each meal plus sliding scale for a max daily dose of 150 units, No Print, Disp-45 mL, R-6      Insulin Needles, Disposable, (ReliOn Pen Needles) 32 gauge x 5/32\" ndle Use to inject medication subcutaneously once weekly DX E11.65, Normal, Disp-10 Pen Needle, R-6      Blood-Glucose Meter monitoring kit Use to check blood glucose level 3 times daily DX E11.65, Normal, Disp-1 Kit, R-0Please dispense insurance preferred brand      glucose blood VI test strips strip Use to check blood glucose level 3 times daily DX E11.65, Normal, Disp-300 Strip, R-6Please dispense insurance preferred brand      lancets misc Use to check blood glucose level 3 times daily DX E11.65, Normal, Disp-300 Each, R-6Please dispense insurance preferred brand      !! albuterol (PROVENTIL HFA, VENTOLIN HFA, PROAIR HFA) 90 mcg/actuation inhaler Take 1 Puff by inhalation every four (4) hours as needed for Wheezing., Normal, Disp-6.7 g, R-1      !! albuterol-ipratropium (DUO-NEB) 2.5 mg-0.5 mg/3 ml nebu 3 mL by Nebulization route every six (6) hours as needed for Wheezing., Normal, Disp-30 Nebule, R-0      buPROPion (WELLBUTRIN) 100 mg tablet Take 100 mg by mouth two (2) times a day., Historical Med      rosuvastatin (CRESTOR) 40 mg tablet Take 40 mg by mouth nightly., Historical Med      spironolactone (ALDACTONE) 100 mg tablet Take 100 mg by mouth daily. , Historical Med      dexlansoprazole (DEXILANT) 30 mg capsule Take 30 mg by mouth daily. , Historical Med       !! - Potential duplicate medications found. Please discuss with provider.         2. Follow-up Information       Follow up With Specialties Details Why Contact Info    Follow up with your primary care physician  Schedule an appointment as soon as possible for a visit in 3 days As needed           Return to ED if worse     Diagnosis     Clinical Impression:   1. Acute bronchitis, unspecified organism        Please note that this dictation was completed with [employer], the computer voice recognition software. Quite often unanticipated grammatical, syntax, homophones, and other interpretive errors are inadvertently transcribed by the computer software. Please disregard these errors. Please excuse any errors that have escaped final proofreading. Thank you.

## 2023-01-22 NOTE — ED TRIAGE NOTES
Pt states felt like getting cold three days ago and had fever of 99.8 last night. Is afebrile today with no medications.

## 2023-02-02 DIAGNOSIS — E11.65 UNCONTROLLED TYPE 2 DIABETES MELLITUS WITH HYPERGLYCEMIA (HCC): ICD-10-CM

## 2023-02-02 DIAGNOSIS — E03.4 HYPOTHYROIDISM DUE TO ACQUIRED ATROPHY OF THYROID: ICD-10-CM

## 2023-02-02 RX ORDER — PEN NEEDLE, DIABETIC 32GX 5/32"
NEEDLE, DISPOSABLE MISCELLANEOUS
Qty: 100 PEN NEEDLE | Refills: 5 | Status: SHIPPED | OUTPATIENT
Start: 2023-02-02

## 2023-02-03 ENCOUNTER — TELEPHONE (OUTPATIENT)
Dept: ENDOCRINOLOGY | Age: 48
End: 2023-02-03

## 2023-02-03 DIAGNOSIS — E11.65 UNCONTROLLED TYPE 2 DIABETES MELLITUS WITH HYPERGLYCEMIA (HCC): Primary | ICD-10-CM

## 2023-02-03 RX ORDER — SEMAGLUTIDE 1.34 MG/ML
INJECTION, SOLUTION SUBCUTANEOUS
Qty: 2 BOX | Refills: 0 | Status: SHIPPED | COMMUNITY
Start: 2023-02-03

## 2023-04-19 ENCOUNTER — TELEPHONE (OUTPATIENT)
Dept: ENDOCRINOLOGY | Age: 48
End: 2023-04-19

## 2023-04-19 NOTE — TELEPHONE ENCOUNTER
Faxed received from [facility] stating they cannot supply pts CGM supplies and to send to local pharmacy . Which system are you sending in for pt ?

## 2023-05-19 ENCOUNTER — TELEPHONE (OUTPATIENT)
Age: 48
End: 2023-05-19

## 2023-05-19 DIAGNOSIS — E11.65 TYPE 2 DIABETES MELLITUS WITH HYPERGLYCEMIA, WITH LONG-TERM CURRENT USE OF INSULIN (HCC): Primary | ICD-10-CM

## 2023-05-19 DIAGNOSIS — Z79.4 TYPE 2 DIABETES MELLITUS WITH HYPERGLYCEMIA, WITH LONG-TERM CURRENT USE OF INSULIN (HCC): Primary | ICD-10-CM

## 2023-05-19 NOTE — TELEPHONE ENCOUNTER
Pt left VM stating that she found a pharmacy that has the freestyle tana 2. Pt wants the freestyle supplies sent to the [address] [place] [address] in [facility].

## 2023-05-31 ENCOUNTER — TELEPHONE (OUTPATIENT)
Age: 48
End: 2023-05-31

## 2023-05-31 NOTE — TELEPHONE ENCOUNTER
Pt left VM wanting to know what to do for blood dropping blood sugars. Pt states that the readings are in the \"normal range\" but are low to her. Pt's main concern is that she is always thirsty and wants to know if the increased thirst is a side effect; pt didn't provide clarification on what is causing said side effect. Will follow up with pt.

## 2023-06-01 RX ORDER — VENLAFAXINE HYDROCHLORIDE 75 MG/1
75 CAPSULE, EXTENDED RELEASE ORAL DAILY
COMMUNITY

## 2023-06-01 NOTE — TELEPHONE ENCOUNTER
Low sugar is less than 70, she does not have really low    It  is taking time for her to adjust to the normal sugars as her A1c was very high in the past more than 12.     Her blood glucose readings are really good, reassure her and eventually she will feel better

## 2023-06-01 NOTE — TELEPHONE ENCOUNTER
Pt was informed of Dr. Sharmaine Zhang note and gave a verbal understanding. Pt informed me that her effexor was increased by her PCP. Pt was advised that effexor causes increased thirst and the higher dose is likely making her thirst more prominent. Pt was advised to keep and eye on her sugars, so far they are running normal, and drink plenty of water. Pt was also advised that if the thrist does not subside in a few weeks to notify CDE or her PCP. Pt was also advised that since her PCP changed the dose that they should refill it. Pt gave a verbal understanding with no further concerns.

## 2023-06-01 NOTE — TELEPHONE ENCOUNTER
Called pt to gather information on medication and blood sugar readings. 5/30   6:07a 102   10:32a 96 (pt became shaky)   1:39p 81  4:13p 143    5/31  12:36a 164  6:29a 122  3:26p 125  10:49p 123    6/1  4:50a 166  2:31p 114    Pt states that when her blood sugars are \"low\" she becomes excessively thirsty. Pt uses [employer] on her phone, pt was asked does she check her blood sugars against a regular meter, pt said she ran over the meter. Pt states she takes humalog 12 units before meals, lantus 8 units every morning, and ozempic weekly.

## 2023-09-21 ENCOUNTER — TELEPHONE (OUTPATIENT)
Age: 48
End: 2023-09-21

## 2023-09-21 NOTE — TELEPHONE ENCOUNTER
Attempted to call pt. Unsuccessful. Left message to return call and call back number was left. Pt assiatance has arrived at the office and is ready for pickup.    times 8am-12pm, 1pm-4pm.

## 2023-09-21 NOTE — TELEPHONE ENCOUNTER
Patient told about patient assistance for  she will be here shortly advised she may need to wait a bit depending on patient load at time of arrival

## 2023-09-25 ENCOUNTER — TELEPHONE (OUTPATIENT)
Age: 48
End: 2023-09-25

## 2023-09-25 NOTE — TELEPHONE ENCOUNTER
Patient no showed to lab and follow up called today to scheduled informed of next available she then said thank you and hung up did not make apt

## 2023-10-05 ENCOUNTER — TELEPHONE (OUTPATIENT)
Age: 48
End: 2023-10-05

## 2023-10-05 NOTE — TELEPHONE ENCOUNTER
Supplier, [employer], Brittney Cantrell  called saying she has two orders for Dex Com from 2 different doctors. Please call her at [phone]. She cannot process both.

## 2023-10-06 NOTE — TELEPHONE ENCOUNTER
Returned call to rep Luca Conklin who states that she had figured out what was going and that everything was straight on her end. No further action is needed at this time.

## 2023-10-18 ENCOUNTER — HOSPITAL ENCOUNTER (EMERGENCY)
Facility: HOSPITAL | Age: 48
Discharge: HOME | End: 2023-10-18
Attending: STUDENT IN AN ORGANIZED HEALTH CARE EDUCATION/TRAINING PROGRAM
Payer: MEDICARE

## 2023-10-18 VITALS
BODY MASS INDEX: 52.41 KG/M2 | SYSTOLIC BLOOD PRESSURE: 95 MMHG | TEMPERATURE: 98.5 F | HEART RATE: 85 BPM | DIASTOLIC BLOOD PRESSURE: 60 MMHG | RESPIRATION RATE: 16 BRPM | HEIGHT: 59 IN | OXYGEN SATURATION: 98 % | WEIGHT: 260 LBS

## 2023-10-18 DIAGNOSIS — H65.02 NON-RECURRENT ACUTE SEROUS OTITIS MEDIA OF LEFT EAR: Primary | ICD-10-CM

## 2023-10-18 PROCEDURE — 99283 EMERGENCY DEPT VISIT LOW MDM: CPT

## 2023-10-18 PROCEDURE — 6370000000 HC RX 637 (ALT 250 FOR IP): Performed by: STUDENT IN AN ORGANIZED HEALTH CARE EDUCATION/TRAINING PROGRAM

## 2023-10-18 RX ORDER — CEFDINIR 300 MG/1
300 CAPSULE ORAL
Status: COMPLETED | OUTPATIENT
Start: 2023-10-18 | End: 2023-10-18

## 2023-10-18 RX ORDER — CEFDINIR 300 MG/1
300 CAPSULE ORAL 2 TIMES DAILY
Qty: 14 CAPSULE | Refills: 0 | Status: SHIPPED | OUTPATIENT
Start: 2023-10-18 | End: 2023-10-25

## 2023-10-18 RX ADMIN — CEFDINIR 300 MG: 300 CAPSULE ORAL at 23:13

## 2023-10-18 ASSESSMENT — LIFESTYLE VARIABLES
HOW OFTEN DO YOU HAVE A DRINK CONTAINING ALCOHOL: MONTHLY OR LESS
HOW MANY STANDARD DRINKS CONTAINING ALCOHOL DO YOU HAVE ON A TYPICAL DAY: 1 OR 2

## 2023-10-19 NOTE — ED TRIAGE NOTES
Pt c/o right earache since this morning; last dose of Tylenol @ 1800; pt took 1 dose of Cipro 500mg that she had at home from a previous \"diverticulitis flare-up\"

## 2023-10-19 NOTE — ED PROVIDER NOTES
(Who and What was discussed)  None     Social Determinants affecting Dx or Tx: None    PROCEDURES   Unless otherwise noted above, none  Procedures      CRITICAL CARE TIME   Patient does not meet Critical Care Time, 0 minutes    ED FINAL IMPRESSION     1. Non-recurrent acute serous otitis media of left ear          DISPOSITION/PLAN   DISPOSITION Decision To Discharge 10/18/2023 10:45:15 PM    Discharge Note: The patient is stable for discharge home. The signs, symptoms, diagnosis, and discharge instructions have been discussed, understanding conveyed, and agreed upon. The patient is to follow up as recommended or return to ER should their symptoms worsen. PATIENT REFERRED TO:  Anderson Runner, MD [place]  [address] [place] [address]  [phone]    In 3 days          DISCHARGE MEDICATIONS:     Medication List        START taking these medications      cefdinir 300 MG capsule  Commonly known as: OMNICEF  Take 1 capsule by mouth 2 times daily for 7 days            CONTINUE taking these medications      FreeStyle Hayden 2 Busby Debora  Use to check BG 4 times daily. Dx code: E11.65     FreeStyle Hayden 2 Sensor Misc  Use to check BG 4 times daily.  Dx code: E11.65     Lancets Misc            ASK your doctor about these medications      albuterol sulfate  (90 Base) MCG/ACT inhaler  Commonly known as: PROVENTIL;VENTOLIN;PROAIR     ammonium lactate 12 % cream  Commonly known as: AMLACTIN     ARIPiprazole 10 MG tablet  Commonly known as: ABILIFY     azithromycin 250 MG tablet  Commonly known as: ZITHROMAX     buPROPion 100 MG tablet  Commonly known as: WELLBUTRIN     busPIRone 7.5 MG tablet  Commonly known as: BUSPAR     clotrimazole 1 % cream  Commonly known as: LOTRIMIN     dexlansoprazole 30 MG Cpdr delayed release capsule  Commonly known as: DEXILANT     dextromethorphan-guaiFENesin  MG per extended release tablet  Commonly known as: MUCINEX DM     Effexor XR 75 MG extended release

## 2023-12-12 ENCOUNTER — HOSPITAL ENCOUNTER (EMERGENCY)
Facility: HOSPITAL | Age: 48
Discharge: HOME | End: 2023-12-12
Payer: COMMERCIAL

## 2023-12-12 VITALS
HEART RATE: 88 BPM | WEIGHT: 261 LBS | HEIGHT: 59 IN | OXYGEN SATURATION: 96 % | DIASTOLIC BLOOD PRESSURE: 65 MMHG | SYSTOLIC BLOOD PRESSURE: 103 MMHG | RESPIRATION RATE: 20 BRPM | BODY MASS INDEX: 52.62 KG/M2 | TEMPERATURE: 97.9 F

## 2023-12-12 DIAGNOSIS — R11.2 NAUSEA AND VOMITING, UNSPECIFIED VOMITING TYPE: Primary | ICD-10-CM

## 2023-12-12 LAB
EKG ATRIAL RATE: 88 BPM
EKG DIAGNOSIS: NORMAL
EKG P AXIS: 69 DEGREES
EKG P-R INTERVAL: 168 MS
EKG Q-T INTERVAL: 364 MS
EKG QRS DURATION: 74 MS
EKG QTC CALCULATION (BAZETT): 440 MS
EKG R AXIS: 0 DEGREES
EKG T AXIS: 49 DEGREES
EKG VENTRICULAR RATE: 88 BPM

## 2023-12-12 PROCEDURE — 99283 EMERGENCY DEPT VISIT LOW MDM: CPT

## 2023-12-12 PROCEDURE — 6370000000 HC RX 637 (ALT 250 FOR IP)

## 2023-12-12 PROCEDURE — 93005 ELECTROCARDIOGRAM TRACING: CPT

## 2023-12-12 RX ORDER — ONDANSETRON 4 MG/1
4 TABLET, ORALLY DISINTEGRATING ORAL
Status: COMPLETED | OUTPATIENT
Start: 2023-12-12 | End: 2023-12-12

## 2023-12-12 RX ORDER — ONDANSETRON 4 MG/1
4 TABLET, ORALLY DISINTEGRATING ORAL 3 TIMES DAILY PRN
Qty: 21 TABLET | Refills: 0 | Status: SHIPPED | OUTPATIENT
Start: 2023-12-12

## 2023-12-12 RX ADMIN — ONDANSETRON 4 MG: 4 TABLET, ORALLY DISINTEGRATING ORAL at 13:37

## 2023-12-12 ASSESSMENT — PAIN - FUNCTIONAL ASSESSMENT: PAIN_FUNCTIONAL_ASSESSMENT: 0-10

## 2023-12-12 ASSESSMENT — PAIN SCALES - GENERAL: PAINLEVEL_OUTOF10: 0

## 2023-12-12 NOTE — ED PROVIDER NOTES
Take 1 tablet by mouth 2 times daily      insulin glargine (LANTUS SOLOSTAR) 100 UNIT/ML injection pen Inject 80 units subcutaneously in the AM and at bedtime      insulin lispro, 1 Unit Dial, (HUMALOG KWIKPEN) 100 UNIT/ML SOPN Inject 20 units subcutaneously before each meal plus sliding scale for a max daily dose of 150 units      insulin lispro (HUMALOG) 100 UNIT/ML SOLN injection vial Inject 20 units subcutaneously before each meal plus sliding scale for a max daily dose of 150 units      ipratropium-albuterol (DUONEB) 0.5-2.5 (3) MG/3ML SOLN nebulizer solution Inhale 3 mLs into the lungs every 6 hours as needed      levothyroxine (SYNTHROID) 150 MCG tablet Take 1 tablet by mouth every morning (before breakfast)      predniSONE (DELTASONE) 20 MG tablet 3 tablets for 2 days then 2 tablets for 2 days then 1 tablet for 2 day      pregabalin (LYRICA) 100 MG capsule Take 1 capsule by mouth 2 times daily.  Max Daily Amount: 200 mg      rosuvastatin (CRESTOR) 40 MG tablet Take 1 tablet by mouth nightly      Semaglutide,0.25 or 0.5MG/DOS, (OZEMPIC, 0.25 OR 0.5 MG/DOSE,) 2 MG/1.5ML SOPN 2 boxes given as samples per Dr Marianela Lopez      spironolactone (ALDACTONE) 100 MG tablet Take 1 tablet by mouth daily         Social Determinants of Health:   Social Determinants of Health     Tobacco Use: Medium Risk (12/12/2023)    Patient History     Smoking Tobacco Use: Former     Smokeless Tobacco Use: Never     Passive Exposure: Not on file   Alcohol Use: Not At Risk (10/18/2023)    AUDIT-C     Frequency of Alcohol Consumption: Monthly or less     Average Number of Drinks: 1 or 2     Frequency of Binge Drinking: Less than monthly   Financial Resource Strain: Not on file   Food Insecurity: Not on file   Transportation Needs: Not on file   Physical Activity: Not on file   Stress: Not on file   Social Connections: Not on file   Intimate Partner Violence: Not on file   Depression: Not at risk (4/14/2023)    PHQ-2     PHQ-2 Score: 0   Housing

## 2023-12-12 NOTE — DISCHARGE INSTRUCTIONS
prescription has been provided, please have it filled as soon as possible to prevent a delay in treatment. If you have any questions or reservations about taking the medication due to side effects or interactions with other medications, please call your primary care provider or contact the ER.

## 2024-10-23 ENCOUNTER — APPOINTMENT (OUTPATIENT)
Facility: HOSPITAL | Age: 49
End: 2024-10-23

## 2024-10-23 ENCOUNTER — HOSPITAL ENCOUNTER (EMERGENCY)
Facility: HOSPITAL | Age: 49
Discharge: HOME | End: 2024-10-24
Attending: STUDENT IN AN ORGANIZED HEALTH CARE EDUCATION/TRAINING PROGRAM

## 2024-10-23 VITALS
OXYGEN SATURATION: 97 % | BODY MASS INDEX: 49.61 KG/M2 | SYSTOLIC BLOOD PRESSURE: 140 MMHG | HEIGHT: 62 IN | WEIGHT: 269.6 LBS | HEART RATE: 93 BPM | TEMPERATURE: 98.1 F | DIASTOLIC BLOOD PRESSURE: 91 MMHG | RESPIRATION RATE: 18 BRPM

## 2024-10-23 DIAGNOSIS — J06.9 VIRAL URI: Primary | ICD-10-CM

## 2024-10-23 PROCEDURE — 99284 EMERGENCY DEPT VISIT MOD MDM: CPT

## 2024-10-23 PROCEDURE — 71045 X-RAY EXAM CHEST 1 VIEW: CPT

## 2024-10-23 ASSESSMENT — PAIN - FUNCTIONAL ASSESSMENT: PAIN_FUNCTIONAL_ASSESSMENT: 0-10

## 2024-10-23 ASSESSMENT — PAIN SCALES - GENERAL: PAINLEVEL_OUTOF10: 0

## 2024-10-24 LAB
FLUAV RNA SPEC QL NAA+PROBE: NOT DETECTED
FLUBV RNA SPEC QL NAA+PROBE: NOT DETECTED
SARS-COV-2 RNA RESP QL NAA+PROBE: NOT DETECTED

## 2024-10-24 PROCEDURE — 6370000000 HC RX 637 (ALT 250 FOR IP): Performed by: STUDENT IN AN ORGANIZED HEALTH CARE EDUCATION/TRAINING PROGRAM

## 2024-10-24 PROCEDURE — 87636 SARSCOV2 & INF A&B AMP PRB: CPT

## 2024-10-24 PROCEDURE — 2500000003 HC RX 250 WO HCPCS: Performed by: STUDENT IN AN ORGANIZED HEALTH CARE EDUCATION/TRAINING PROGRAM

## 2024-10-24 RX ORDER — IPRATROPIUM BROMIDE AND ALBUTEROL SULFATE 2.5; .5 MG/3ML; MG/3ML
1 SOLUTION RESPIRATORY (INHALATION)
Status: DISCONTINUED | OUTPATIENT
Start: 2024-10-24 | End: 2024-10-24

## 2024-10-24 RX ORDER — DIPHENHYDRAMINE HCL 25 MG
25 CAPSULE ORAL EVERY 6 HOURS PRN
Qty: 20 CAPSULE | Refills: 0 | Status: SHIPPED | OUTPATIENT
Start: 2024-10-24

## 2024-10-24 RX ORDER — OXYMETAZOLINE HYDROCHLORIDE 0.05 G/100ML
2 SPRAY NASAL
Status: COMPLETED | OUTPATIENT
Start: 2024-10-24 | End: 2024-10-24

## 2024-10-24 RX ORDER — GUAIFENESIN 100 MG/5ML
200 SOLUTION ORAL EVERY 6 HOURS PRN
Qty: 180 ML | Refills: 0 | Status: SHIPPED | OUTPATIENT
Start: 2024-10-24

## 2024-10-24 RX ORDER — IPRATROPIUM BROMIDE AND ALBUTEROL SULFATE 2.5; .5 MG/3ML; MG/3ML
1 SOLUTION RESPIRATORY (INHALATION) ONCE
Status: COMPLETED | OUTPATIENT
Start: 2024-10-24 | End: 2024-10-24

## 2024-10-24 RX ORDER — GUAIFENESIN 200 MG/10ML
200 LIQUID ORAL ONCE
Status: COMPLETED | OUTPATIENT
Start: 2024-10-24 | End: 2024-10-24

## 2024-10-24 RX ORDER — ALBUTEROL SULFATE 90 UG/1
2 INHALANT RESPIRATORY (INHALATION) EVERY 4 HOURS PRN
Qty: 18 G | Refills: 2 | Status: SHIPPED | OUTPATIENT
Start: 2024-10-24

## 2024-10-24 RX ADMIN — GUAIFENESIN 200 MG: 200 SOLUTION ORAL at 00:26

## 2024-10-24 RX ADMIN — OXYMETAZOLINE HYDROCHLORIDE 2 SPRAY: 0.5 SPRAY NASAL at 00:26

## 2024-10-24 RX ADMIN — IPRATROPIUM BROMIDE AND ALBUTEROL SULFATE 1 DOSE: .5; 2.5 SOLUTION RESPIRATORY (INHALATION) at 00:26

## 2024-10-24 ASSESSMENT — LIFESTYLE VARIABLES
HOW MANY STANDARD DRINKS CONTAINING ALCOHOL DO YOU HAVE ON A TYPICAL DAY: PATIENT DOES NOT DRINK
HOW OFTEN DO YOU HAVE A DRINK CONTAINING ALCOHOL: NEVER

## 2024-10-24 NOTE — DISCHARGE INSTRUCTIONS
Thank you!    Thank you for allowing me to care for you in the emergency department.  I sincerely hope that you are satisfied with your visit today.  It is my goal to provide you with excellent care.    Below you will find a list of your labs and imaging from your visit today if applicable. Should you have any questions regarding these results please do not hesitate to call the emergency department. Please review [employer] for a more detailed result list since the below list may not be comprehensive. Instructions on how to sign up to [employer] should be provided in this packet.    Labs -  No results found for this or any previous visit (from the past 12 hour(s)).    Radiologic Studies -   XR CHEST PORTABLE   Final Result   No acute process.      Electronically signed by Uziel Auguste             If you feel that you have not received excellent quality care or timely care, please ask to speak to the nurse manager. Please choose us in the future for your continued health care needs.   ------------------------------------------------------------------------------------------------------------  The exam and treatment you received in the Emergency Department were for an urgent problem and are not intended as complete care. It is important that you follow-up with a doctor, nurse practitioner, or physician assistant to:  (1) confirm your diagnosis,  (2) re-evaluation of changes in your illness and treatment, and  (3) for ongoing care.  If your symptoms become worse or you do not improve as expected and you are unable to reach your usual health care provider, you should return to the Emergency Department. We are available 24 hours a day.     Please take your discharge instructions with you when you go to your follow-up appointment.     If a prescription has been provided, please have it filled as soon as possible to prevent a delay in treatment. Read the entire medication instruction sheet provided to you by the pharmacy. If

## 2024-10-24 NOTE — ED TRIAGE NOTES
Pt states had a cough yesterday and woke up this morning with a cough, chest congestion and a little difficulty breathing due to the nasal congestion.

## 2024-10-24 NOTE — ED PROVIDER NOTES
[facility]  EMERGENCY DEPARTMENT ENCOUNTER NOTE    Date: 10/23/2024  Patient Name: Mica Cordova    History of Presenting Illness     Chief Complaint   Patient presents with    Cough    Chest Congestion    Shortness of Breath     History obtained from: Patient    HPI: Mica Cordova, 49 y.o. female with past medical history as listed and reviewed below presents for URI symptoms.    The patient reports a 2 day history of the following symptoms: Congestion, Cough, and Headache.  She also experienced sneezing and has been feeling short of breath because she cannot breathe through her nose.  No shortness of breath of his breathing from her mouth.  She does not have any chest pain, shortness of breath, or vomiting.  No fevers or chills.  She has a history of COPD and asthma and has been compliant with her inhalers at home.    Airway obstructive such as stridor, difficulty in swallowing secretions, thickened voice change significantly muffled voice, and significant neck swelling is not present    Patient had exposure to individuals with similar symptoms    Medical History   I reviewed the medical, surgical, family, and social history, as well as allergies:    PCP: Hossein Washington MD    Past Medical History:  Past Medical History:   Diagnosis Date    Asthma     Bipolar 1 disorder (HCC)     Carpal tunnel syndrome, bilateral     Chronic obstructive pulmonary disease (HCC)     Diabetes (HCC)     GERD (gastroesophageal reflux disease)     High cholesterol     Hypothyroidism     Kidney stones     Nicotine vapor product user      Past Surgical History:  Past Surgical History:   Procedure Laterality Date    CHOLECYSTECTOMY      OTHER SURGICAL HISTORY      Uterine Ablation    TONSILLECTOMY      TUBAL LIGATION       Current Outpatient Medications:  Current Outpatient Medications   Medication Instructions    albuterol sulfate HFA (PROVENTIL HFA) 108 (90 Base) MCG/ACT inhaler 2 puffs,

## 2024-12-29 ENCOUNTER — HOSPITAL ENCOUNTER (EMERGENCY)
Facility: HOSPITAL | Age: 49
Discharge: HOME | End: 2024-12-29
Attending: STUDENT IN AN ORGANIZED HEALTH CARE EDUCATION/TRAINING PROGRAM

## 2024-12-29 VITALS
OXYGEN SATURATION: 97 % | WEIGHT: 266.76 LBS | BODY MASS INDEX: 49.09 KG/M2 | HEIGHT: 62 IN | HEART RATE: 81 BPM | RESPIRATION RATE: 16 BRPM | SYSTOLIC BLOOD PRESSURE: 147 MMHG | DIASTOLIC BLOOD PRESSURE: 95 MMHG | TEMPERATURE: 97.8 F

## 2024-12-29 DIAGNOSIS — K08.89 PAIN, DENTAL: Primary | ICD-10-CM

## 2024-12-29 PROCEDURE — 6370000000 HC RX 637 (ALT 250 FOR IP): Performed by: STUDENT IN AN ORGANIZED HEALTH CARE EDUCATION/TRAINING PROGRAM

## 2024-12-29 PROCEDURE — 99283 EMERGENCY DEPT VISIT LOW MDM: CPT

## 2024-12-29 RX ORDER — CLINDAMYCIN HYDROCHLORIDE 300 MG/1
300 CAPSULE ORAL 3 TIMES DAILY
Qty: 21 CAPSULE | Refills: 0 | Status: SHIPPED | OUTPATIENT
Start: 2024-12-29 | End: 2025-01-05

## 2024-12-29 RX ORDER — HYDROCODONE BITARTRATE AND ACETAMINOPHEN 5; 325 MG/1; MG/1
1 TABLET ORAL EVERY 4 HOURS PRN
Qty: 6 TABLET | Refills: 0 | Status: SHIPPED | OUTPATIENT
Start: 2024-12-29 | End: 2025-01-01

## 2024-12-29 RX ADMIN — DIPHENHYDRAMINE HCL ORAL: 12.5 SOLUTION ORAL at 18:28

## 2024-12-29 ASSESSMENT — PAIN - FUNCTIONAL ASSESSMENT
PAIN_FUNCTIONAL_ASSESSMENT: 0-10
PAIN_FUNCTIONAL_ASSESSMENT: NONE - DENIES PAIN

## 2024-12-29 ASSESSMENT — PAIN DESCRIPTION - LOCATION: LOCATION: MOUTH

## 2024-12-29 ASSESSMENT — PAIN DESCRIPTION - ORIENTATION: ORIENTATION: LOWER

## 2024-12-29 ASSESSMENT — PAIN SCALES - GENERAL: PAINLEVEL_OUTOF10: 8

## 2024-12-29 NOTE — DISCHARGE INSTRUCTIONS
Emergency Dental Care     [facility]  [address] [place], [place] [address]  Open M, W, F: 8AM - 5PM and T, Th: 8AM-6PM  Phone: [phone], press 4  $70 for Emergency Care  $60 for first routine care, then pay by sliding scale based upon income.    [facility] [address] [address]  Phone: [phone]    [facility]  [address] [place] [address]  Open Monday - Friday 8AM - 4:30 PM  Phone: [phone]    [facility]  [facility], [facility], [address] [place] [address], 1st Floor  First Come First Service starting at 8:30 AM M-F  Phone: [phone], press 2  Fee: $150 per tooth (x-ray & extractions only)  Pediatrics Phone:: [phone], 8-5 M-F    [facility]  [facility], [facility], [address] [place] [address], 2nd Floor, [address]  First Come First Service starting at 8:30 AM - 3 PM M - F    [facility]  [address] [place] [address]  Phone: [phone] or [phone]  Emergency Hours: 9:30AM - 11AM (extractions)  Simple tooth extraction $ per tooth. #75 for x-ray    [facility] Residents only, over the age of 18  Phone: [phone]. Leave message saying you need an appointment to register.  Hours: Tuesday Evenings

## 2024-12-30 NOTE — ED PROVIDER NOTES
[facility] EMERGENCY DEPT  EMERGENCY DEPARTMENT ENCOUNTER      Pt Name: Mica Cordova  MRN: [id number]  Birthdate 1975  Date of evaluation: 12/29/2024  Provider: Kassidy Vargas PA-C    CHIEF COMPLAINT       Chief Complaint   Patient presents with    Dental Pain         HISTORY OF PRESENT ILLNESS    HPI  Patient is a 49 y.o. female who presents today with complaints of pain in her mouth.  She has had all of her teeth removed area and reports for the last couple days, she has had the pain in her mouth. Denies fever.  Denies any difficulty breathing or handling secretions. Denies facial or oral swelling. Able to eat and drink appropriately.  No change in voice.      Nursing Notes were reviewed.      REVIEW OF SYSTEMS       Review of Systems   HENT:  Positive for dental problem.        Except as noted above the remainder of the review of systems was reviewed and negative.       PAST MEDICAL HISTORY     Past Medical History:   Diagnosis Date    Asthma     Bipolar 1 disorder (HCC)     Carpal tunnel syndrome, bilateral     Chronic obstructive pulmonary disease (HCC)     Diabetes (HCC)     GERD (gastroesophageal reflux disease)     High cholesterol     Hypothyroidism     Kidney stones     Nicotine vapor product user          SURGICAL HISTORY       Past Surgical History:   Procedure Laterality Date    CHOLECYSTECTOMY      OTHER SURGICAL HISTORY      Uterine Ablation    TONSILLECTOMY      TUBAL LIGATION           CURRENT MEDICATIONS       Discharge Medication List as of 12/29/2024  5:59 PM        CONTINUE these medications which have NOT CHANGED    Details   !! albuterol sulfate HFA (PROVENTIL HFA) 108 (90 Base) MCG/ACT inhaler Inhale 2 puffs into the lungs every 4 hours as needed for Wheezing, Disp-18 g, R-2Normal      guaiFENesin (ROBITUSSIN) 200 MG/10ML LIQD oral solution Take 10 mLs by mouth every 6 hours as needed for Cough, Disp-180 mL, R-0Normal      diphenhydrAMINE (BENADRYL ALLERGY) 25 MG capsule Take 1

## 2025-01-30 ENCOUNTER — APPOINTMENT (OUTPATIENT)
Facility: HOSPITAL | Age: 50
End: 2025-01-30
Payer: MEDICARE

## 2025-01-30 ENCOUNTER — HOSPITAL ENCOUNTER (EMERGENCY)
Facility: HOSPITAL | Age: 50
Discharge: HOME | End: 2025-01-30
Attending: EMERGENCY MEDICINE
Payer: MEDICARE

## 2025-01-30 VITALS
SYSTOLIC BLOOD PRESSURE: 109 MMHG | OXYGEN SATURATION: 98 % | TEMPERATURE: 98.8 F | RESPIRATION RATE: 18 BRPM | HEART RATE: 75 BPM | DIASTOLIC BLOOD PRESSURE: 70 MMHG

## 2025-01-30 DIAGNOSIS — E03.9 HYPOTHYROIDISM, UNSPECIFIED TYPE: ICD-10-CM

## 2025-01-30 DIAGNOSIS — R07.9 CHEST PAIN, UNSPECIFIED TYPE: Primary | ICD-10-CM

## 2025-01-30 DIAGNOSIS — R73.9 HYPERGLYCEMIA: ICD-10-CM

## 2025-01-30 LAB
ALBUMIN SERPL-MCNC: 3.3 G/DL (ref 3.5–5)
ALBUMIN/GLOB SERPL: 1.1 (ref 1.1–2.2)
ALP SERPL-CCNC: 167 U/L (ref 45–117)
ALT SERPL-CCNC: 33 U/L (ref 12–78)
ANION GAP SERPL CALC-SCNC: 8 MMOL/L (ref 2–12)
AST SERPL-CCNC: 3 U/L (ref 15–37)
BASOPHILS # BLD: 0.04 K/UL (ref 0–0.1)
BASOPHILS NFR BLD: 0.4 % (ref 0–1)
BILIRUB SERPL-MCNC: 0.6 MG/DL (ref 0.2–1)
BUN SERPL-MCNC: 10 MG/DL (ref 6–20)
BUN/CREAT SERPL: 21 (ref 12–20)
CALCIUM SERPL-MCNC: 7.9 MG/DL (ref 8.5–10.1)
CHLORIDE SERPL-SCNC: 101 MMOL/L (ref 97–108)
CO2 SERPL-SCNC: 28 MMOL/L (ref 21–32)
CREAT SERPL-MCNC: 0.48 MG/DL (ref 0.55–1.02)
DIFFERENTIAL METHOD BLD: ABNORMAL
EOSINOPHIL # BLD: 0.07 K/UL (ref 0–0.4)
EOSINOPHIL NFR BLD: 0.8 % (ref 0–7)
ERYTHROCYTE [DISTWIDTH] IN BLOOD BY AUTOMATED COUNT: 12.8 % (ref 11.5–14.5)
GLOBULIN SER CALC-MCNC: 3 G/DL (ref 2–4)
GLUCOSE SERPL-MCNC: 390 MG/DL (ref 65–100)
HCT VFR BLD AUTO: 42.2 % (ref 35–47)
HGB BLD-MCNC: 14.9 G/DL (ref 11.5–16)
IMM GRANULOCYTES # BLD AUTO: 0.08 K/UL (ref 0–0.04)
IMM GRANULOCYTES NFR BLD AUTO: 0.9 % (ref 0–0.5)
LYMPHOCYTES # BLD: 3.41 K/UL (ref 0.8–3.5)
LYMPHOCYTES NFR BLD: 36.9 % (ref 12–49)
MCH RBC QN AUTO: 32.2 PG (ref 26–34)
MCHC RBC AUTO-ENTMCNC: 35.3 G/DL (ref 30–36.5)
MCV RBC AUTO: 91.1 FL (ref 80–99)
MONOCYTES # BLD: 0.46 K/UL (ref 0–1)
MONOCYTES NFR BLD: 5 % (ref 5–13)
NEUTS SEG # BLD: 5.17 K/UL (ref 1.8–8)
NEUTS SEG NFR BLD: 56 % (ref 32–75)
NRBC # BLD: 0 K/UL (ref 0–0.01)
NRBC BLD-RTO: 0 PER 100 WBC
PLATELET # BLD AUTO: 268 K/UL (ref 150–400)
PMV BLD AUTO: 10 FL (ref 8.9–12.9)
POTASSIUM SERPL-SCNC: 4 MMOL/L (ref 3.5–5.1)
PROT SERPL-MCNC: 6.3 G/DL (ref 6.4–8.2)
RBC # BLD AUTO: 4.63 M/UL (ref 3.8–5.2)
SODIUM SERPL-SCNC: 137 MMOL/L (ref 136–145)
TROPONIN I SERPL HS-MCNC: <4 NG/L (ref 0–51)
TSH SERPL DL<=0.05 MIU/L-ACNC: 17.55 UIU/ML (ref 0.36–3.74)
WBC # BLD AUTO: 9.2 K/UL (ref 3.6–11)

## 2025-01-30 PROCEDURE — 71045 X-RAY EXAM CHEST 1 VIEW: CPT

## 2025-01-30 PROCEDURE — 80053 COMPREHEN METABOLIC PANEL: CPT

## 2025-01-30 PROCEDURE — 84443 ASSAY THYROID STIM HORMONE: CPT

## 2025-01-30 PROCEDURE — 93005 ELECTROCARDIOGRAM TRACING: CPT | Performed by: EMERGENCY MEDICINE

## 2025-01-30 PROCEDURE — 84484 ASSAY OF TROPONIN QUANT: CPT

## 2025-01-30 PROCEDURE — 85025 COMPLETE CBC W/AUTO DIFF WBC: CPT

## 2025-01-30 PROCEDURE — 99285 EMERGENCY DEPT VISIT HI MDM: CPT

## 2025-01-30 PROCEDURE — 36415 COLL VENOUS BLD VENIPUNCTURE: CPT

## 2025-01-30 RX ORDER — LEVOTHYROXINE SODIUM 150 UG/1
150 TABLET ORAL DAILY
Qty: 30 TABLET | Refills: 0 | Status: SHIPPED | OUTPATIENT
Start: 2025-01-30

## 2025-01-30 ASSESSMENT — PAIN DESCRIPTION - PAIN TYPE: TYPE: ACUTE PAIN

## 2025-01-30 ASSESSMENT — PAIN - FUNCTIONAL ASSESSMENT
PAIN_FUNCTIONAL_ASSESSMENT: NONE - DENIES PAIN
PAIN_FUNCTIONAL_ASSESSMENT: PREVENTS OR INTERFERES SOME ACTIVE ACTIVITIES AND ADLS

## 2025-01-30 ASSESSMENT — PAIN DESCRIPTION - ORIENTATION: ORIENTATION: MID

## 2025-01-30 ASSESSMENT — PAIN DESCRIPTION - LOCATION: LOCATION: CHEST

## 2025-01-30 ASSESSMENT — PAIN DESCRIPTION - FREQUENCY: FREQUENCY: CONTINUOUS

## 2025-01-30 ASSESSMENT — PAIN SCALES - GENERAL: PAINLEVEL_OUTOF10: 6

## 2025-01-30 ASSESSMENT — PAIN DESCRIPTION - ONSET: ONSET: SUDDEN

## 2025-01-30 ASSESSMENT — PAIN DESCRIPTION - DESCRIPTORS: DESCRIPTORS: DISCOMFORT

## 2025-01-31 NOTE — ED NOTES
Pt discharged home ambulatory and hemodynamically stable.Discharge instructions reviewed and pt has no concerns at this time.Transport set up for her.

## 2025-01-31 NOTE — ED TRIAGE NOTES
Pt arrives via EMS.  EMS states that pt has been complaining of chest discomfort with breathing since 1600.  Pt states that she has not been taking her meds and has been trying to control all of her medical condition by diet.

## 2025-01-31 NOTE — ED PROVIDER NOTES
[facility] EMERGENCY DEPARTMENT  EMERGENCY DEPARTMENT ENCOUNTER      Pt Name: Mica Cordova  MRN: [id number]  Birthdate 1975  Date of evaluation: 1/30/2025  Provider: Ryan Kevin MD      HISTORY OF PRESENT ILLNESS      49-year-old female history bipolar, COPD, diabetes, GERD, anxiety presents to the emergency department EMS with chief complaint chest pain which she thinks is anxiety related.  She reports she has not been taking any of her medicines for about 2 years because of insurance issues.  She is pending follow-up with her primary care in about 2 weeks because she just got her insurance back.  She reports something is help with her anxiety is Klonopin but she has not had it for years.    The history is provided by the patient, medical records and the EMS personnel.           Nursing Notes were reviewed.    REVIEW OF SYSTEMS         Review of Systems        PAST MEDICAL HISTORY     Past Medical History:   Diagnosis Date    Asthma     Bipolar 1 disorder (HCC)     Carpal tunnel syndrome, bilateral     Chronic obstructive pulmonary disease (HCC)     Diabetes (HCC)     GERD (gastroesophageal reflux disease)     High cholesterol     Hypothyroidism     Kidney stones     Nicotine vapor product user          SURGICAL HISTORY       Past Surgical History:   Procedure Laterality Date    CHOLECYSTECTOMY      OTHER SURGICAL HISTORY      Uterine Ablation    TONSILLECTOMY      TUBAL LIGATION           CURRENT MEDICATIONS       Previous Medications    ALBUTEROL SULFATE HFA (PROVENTIL HFA) 108 (90 BASE) MCG/ACT INHALER    Inhale 2 puffs into the lungs every 4 hours as needed for Wheezing    ALBUTEROL SULFATE HFA (PROVENTIL;VENTOLIN;PROAIR) 108 (90 BASE) MCG/ACT INHALER    Inhale 1 puff into the lungs every 4 hours as needed    AMMONIUM LACTATE (AMLACTIN) 12 % CREAM    Apply topically 2 times daily    ARIPIPRAZOLE (ABILIFY) 10 MG TABLET    Take 1 tablet by mouth daily    AZITHROMYCIN (ZITHROMAX) 250 MG

## 2025-02-01 LAB
EKG ATRIAL RATE: 70 BPM
EKG DIAGNOSIS: NORMAL
EKG P AXIS: 69 DEGREES
EKG P-R INTERVAL: 166 MS
EKG Q-T INTERVAL: 388 MS
EKG QRS DURATION: 80 MS
EKG QTC CALCULATION (BAZETT): 419 MS
EKG R AXIS: -25 DEGREES
EKG T AXIS: 45 DEGREES
EKG VENTRICULAR RATE: 70 BPM

## 2025-07-14 ENCOUNTER — HOSPITAL ENCOUNTER (EMERGENCY)
Facility: HOSPITAL | Age: 50
Discharge: HOME | End: 2025-07-14
Attending: EMERGENCY MEDICINE
Payer: MEDICARE

## 2025-07-14 VITALS
DIASTOLIC BLOOD PRESSURE: 60 MMHG | OXYGEN SATURATION: 93 % | HEIGHT: 62 IN | BODY MASS INDEX: 47.84 KG/M2 | RESPIRATION RATE: 18 BRPM | SYSTOLIC BLOOD PRESSURE: 119 MMHG | HEART RATE: 77 BPM | WEIGHT: 260 LBS | TEMPERATURE: 98.2 F

## 2025-07-14 DIAGNOSIS — R74.8 ELEVATED ALKALINE PHOSPHATASE LEVEL: ICD-10-CM

## 2025-07-14 DIAGNOSIS — R42 DIZZINESS: Primary | ICD-10-CM

## 2025-07-14 DIAGNOSIS — R42 VERTIGO: ICD-10-CM

## 2025-07-14 DIAGNOSIS — J44.9 CHRONIC OBSTRUCTIVE PULMONARY DISEASE, UNSPECIFIED COPD TYPE (HCC): ICD-10-CM

## 2025-07-14 DIAGNOSIS — E11.65 UNCONTROLLED DIABETES MELLITUS WITH HYPERGLYCEMIA, WITHOUT LONG-TERM CURRENT USE OF INSULIN (HCC): ICD-10-CM

## 2025-07-14 LAB
ALBUMIN SERPL-MCNC: 3 G/DL (ref 3.5–5)
ALBUMIN SERPL-MCNC: 3.1 G/DL (ref 3.5–5)
ALBUMIN/GLOB SERPL: 0.9 (ref 1.1–2.2)
ALBUMIN/GLOB SERPL: ABNORMAL (ref 1.1–2.2)
ALP SERPL-CCNC: 147 U/L (ref 45–117)
ALP SERPL-CCNC: 165 U/L (ref 45–117)
ALT SERPL-CCNC: 21 U/L (ref 12–78)
ALT SERPL-CCNC: ABNORMAL U/L (ref 12–78)
ANION GAP SERPL CALC-SCNC: 7 MMOL/L (ref 2–12)
ANION GAP SERPL CALC-SCNC: 9 MMOL/L (ref 2–12)
AST SERPL W P-5'-P-CCNC: 18 U/L (ref 15–37)
AST SERPL W P-5'-P-CCNC: ABNORMAL U/L (ref 15–37)
BASOPHILS # BLD: 0.04 K/UL (ref 0–0.1)
BASOPHILS NFR BLD: 0.4 % (ref 0–1)
BILIRUB SERPL-MCNC: 0.2 MG/DL (ref 0.2–1)
BILIRUB SERPL-MCNC: 0.6 MG/DL (ref 0.2–1)
BUN SERPL-MCNC: 11 MG/DL (ref 6–20)
BUN SERPL-MCNC: 12 MG/DL (ref 6–20)
BUN/CREAT SERPL: 22 (ref 12–20)
BUN/CREAT SERPL: 25 (ref 12–20)
CA-I BLD-MCNC: 8.8 MG/DL (ref 8.5–10.1)
CA-I BLD-MCNC: 8.9 MG/DL (ref 8.5–10.1)
CHLORIDE SERPL-SCNC: 102 MMOL/L (ref 97–108)
CHLORIDE SERPL-SCNC: 106 MMOL/L (ref 97–108)
CO2 SERPL-SCNC: 26 MMOL/L (ref 21–32)
CO2 SERPL-SCNC: 27 MMOL/L (ref 21–32)
CREAT SERPL-MCNC: 0.48 MG/DL (ref 0.55–1.02)
CREAT SERPL-MCNC: 0.5 MG/DL (ref 0.55–1.02)
DIFFERENTIAL METHOD BLD: NORMAL
EKG ATRIAL RATE: 75 BPM
EKG DIAGNOSIS: NORMAL
EKG P AXIS: 70 DEGREES
EKG P-R INTERVAL: 184 MS
EKG Q-T INTERVAL: 388 MS
EKG QRS DURATION: 80 MS
EKG QTC CALCULATION (BAZETT): 433 MS
EKG R AXIS: -4 DEGREES
EKG T AXIS: 54 DEGREES
EKG VENTRICULAR RATE: 75 BPM
EOSINOPHIL # BLD: 0.09 K/UL (ref 0–0.4)
EOSINOPHIL NFR BLD: 1 % (ref 0–7)
ERYTHROCYTE [DISTWIDTH] IN BLOOD BY AUTOMATED COUNT: 12.5 % (ref 11.5–14.5)
GLOBULIN SER CALC-MCNC: 3.4 G/DL (ref 2–4)
GLOBULIN SER CALC-MCNC: ABNORMAL G/DL (ref 2–4)
GLUCOSE BLD STRIP.AUTO-MCNC: 263 MG/DL (ref 65–100)
GLUCOSE BLD STRIP.AUTO-MCNC: 346 MG/DL (ref 65–100)
GLUCOSE SERPL-MCNC: 301 MG/DL (ref 65–100)
GLUCOSE SERPL-MCNC: 326 MG/DL (ref 65–100)
HCT VFR BLD AUTO: 43.1 % (ref 35–47)
HGB BLD-MCNC: 14.2 G/DL (ref 11.5–16)
IMM GRANULOCYTES # BLD AUTO: 0.02 K/UL (ref 0–0.04)
IMM GRANULOCYTES NFR BLD AUTO: 0.2 % (ref 0–0.5)
LYMPHOCYTES # BLD: 3.21 K/UL (ref 0.8–3.5)
LYMPHOCYTES NFR BLD: 35.5 % (ref 12–49)
MCH RBC QN AUTO: 30.9 PG (ref 26–34)
MCHC RBC AUTO-ENTMCNC: 32.9 G/DL (ref 30–36.5)
MCV RBC AUTO: 93.7 FL (ref 80–99)
MONOCYTES # BLD: 0.62 K/UL (ref 0–1)
MONOCYTES NFR BLD: 6.9 % (ref 5–13)
NEUTS SEG # BLD: 5.06 K/UL (ref 1.8–8)
NEUTS SEG NFR BLD: 56 % (ref 32–75)
PERFORMED BY:: ABNORMAL
PERFORMED BY:: ABNORMAL
PLATELET # BLD AUTO: 240 K/UL (ref 150–400)
PMV BLD AUTO: 9.9 FL (ref 8.9–12.9)
POTASSIUM SERPL-SCNC: 4.6 MMOL/L (ref 3.5–5.1)
POTASSIUM SERPL-SCNC: ABNORMAL MMOL/L (ref 3.5–5.1)
PROT SERPL-MCNC: 6.5 G/DL (ref 6.4–8.2)
PROT SERPL-MCNC: ABNORMAL G/DL (ref 6.4–8.2)
RBC # BLD AUTO: 4.6 M/UL (ref 3.8–5.2)
SODIUM SERPL-SCNC: 137 MMOL/L (ref 136–145)
SODIUM SERPL-SCNC: 140 MMOL/L (ref 136–145)
TROPONIN I SERPL HS-MCNC: 4 NG/L (ref 0–51)
TSH SERPL DL<=0.05 MIU/L-ACNC: 1.92 UIU/ML (ref 0.36–3.74)
WBC # BLD AUTO: 9 K/UL (ref 3.6–11)

## 2025-07-14 PROCEDURE — 80053 COMPREHEN METABOLIC PANEL: CPT

## 2025-07-14 PROCEDURE — 6370000000 HC RX 637 (ALT 250 FOR IP): Performed by: EMERGENCY MEDICINE

## 2025-07-14 PROCEDURE — 82962 GLUCOSE BLOOD TEST: CPT

## 2025-07-14 PROCEDURE — 2580000003 HC RX 258: Performed by: EMERGENCY MEDICINE

## 2025-07-14 PROCEDURE — 93005 ELECTROCARDIOGRAM TRACING: CPT | Performed by: EMERGENCY MEDICINE

## 2025-07-14 PROCEDURE — 84484 ASSAY OF TROPONIN QUANT: CPT

## 2025-07-14 PROCEDURE — 85025 COMPLETE CBC W/AUTO DIFF WBC: CPT

## 2025-07-14 PROCEDURE — 99284 EMERGENCY DEPT VISIT MOD MDM: CPT

## 2025-07-14 PROCEDURE — 36415 COLL VENOUS BLD VENIPUNCTURE: CPT

## 2025-07-14 PROCEDURE — 96360 HYDRATION IV INFUSION INIT: CPT

## 2025-07-14 PROCEDURE — 84443 ASSAY THYROID STIM HORMONE: CPT

## 2025-07-14 RX ORDER — ONDANSETRON 4 MG/1
4 TABLET, ORALLY DISINTEGRATING ORAL ONCE
Status: COMPLETED | OUTPATIENT
Start: 2025-07-14 | End: 2025-07-14

## 2025-07-14 RX ORDER — 0.9 % SODIUM CHLORIDE 0.9 %
1000 INTRAVENOUS SOLUTION INTRAVENOUS
Status: COMPLETED | OUTPATIENT
Start: 2025-07-14 | End: 2025-07-14

## 2025-07-14 RX ORDER — MECLIZINE HCL 12.5 MG 12.5 MG/1
25 TABLET ORAL
Status: COMPLETED | OUTPATIENT
Start: 2025-07-14 | End: 2025-07-14

## 2025-07-14 RX ADMIN — SODIUM CHLORIDE 1000 ML: 0.9 INJECTION, SOLUTION INTRAVENOUS at 07:53

## 2025-07-14 RX ADMIN — MECLIZINE 25 MG: 12.5 TABLET ORAL at 07:12

## 2025-07-14 RX ADMIN — ONDANSETRON 4 MG: 4 TABLET, ORALLY DISINTEGRATING ORAL at 07:12

## 2025-07-14 ASSESSMENT — LIFESTYLE VARIABLES
HOW OFTEN DO YOU HAVE A DRINK CONTAINING ALCOHOL: NEVER
HOW MANY STANDARD DRINKS CONTAINING ALCOHOL DO YOU HAVE ON A TYPICAL DAY: PATIENT DOES NOT DRINK

## 2025-07-14 ASSESSMENT — PAIN - FUNCTIONAL ASSESSMENT: PAIN_FUNCTIONAL_ASSESSMENT: NONE - DENIES PAIN

## 2025-07-14 NOTE — ED PROVIDER NOTES
[facility] EMERGENCY DEPT  EMERGENCY DEPARTMENT HISTORY AND PHYSICAL EXAM      Date of evaluation: 7/14/2025  Patient Name: Mica Cordova  Birthdate 1975  MRN: [id number]  ED Provider: Lottie Dillon MD   Note Started: 7:02 AM EDT 7/14/25    HISTORY OF PRESENT ILLNESS     Chief Complaint   Patient presents with    Dizziness       History Provided By: Patient, only     HPI: Mica Cordova is a 49 y.o. female with DM, COPD, hypothyroidism, presents with intermittent dizziness that is worse when sitting or lying down for past three days without focal neurologic complaints such as weakness, gait abnormalities feeling off balance or falling. She also denies NV, vision changes,  chest pain, SOB. She does however endorse elevated blood sugars in the 300s due to being off her medicine for insurance reasons.    PAST MEDICAL HISTORY   Past Medical History:  Past Medical History:   Diagnosis Date    Asthma     Bipolar 1 disorder (HCC)     Carpal tunnel syndrome, bilateral     Chronic obstructive pulmonary disease (HCC)     Diabetes (HCC)     GERD (gastroesophageal reflux disease)     High cholesterol     Hypothyroidism     Kidney stones     Nicotine vapor product user        Past Surgical History:  Past Surgical History:   Procedure Laterality Date    CHOLECYSTECTOMY      OTHER SURGICAL HISTORY      Uterine Ablation    TONSILLECTOMY      TUBAL LIGATION         Family History:  History reviewed. No pertinent family history.    Social History:  Social History     Tobacco Use    Smoking status: Some Days     Current packs/day: 0.25     Types: Cigarettes    Smokeless tobacco: Never   Substance Use Topics    Alcohol use: Not Currently    Drug use: Not Currently       Allergies:  Allergies   Allergen Reactions    Latex Rash    Aspirin Hives    Levofloxacin Other (See Comments)     \"my arm got really hot and broke out really red the last time I had the IV version\"    Penicillins Hives    Sulfa Antibiotics Hives    Adhesive

## 2025-07-14 NOTE — DISCHARGE INSTRUCTIONS
Thank you for choosing our Emergency Department for your care.  It is our privilege to care for you in your time of need.  In the next several days, you may receive a survey via email or mailed to your home about your experience with our team.  We would greatly appreciate you taking a few minutes to complete the survey, as we use this information to learn what we have done well and what we could be doing better. Thank you for trusting us with your care!    Below you will find a list of your tests from today's visit.   Labs and Radiology Studies  Recent Results (from the past 12 hours)   CBC with Auto Differential    Collection Time: 07/14/25  7:00 AM   Result Value Ref Range    WBC 9.0 3.6 - 11.0 K/uL    RBC 4.60 3.80 - 5.20 M/uL    Hemoglobin 14.2 11.5 - 16.0 g/dL    Hematocrit 43.1 35.0 - 47.0 %    MCV 93.7 80.0 - 99.0 FL    MCH 30.9 26.0 - 34.0 PG    MCHC 32.9 30.0 - 36.5 g/dL    RDW 12.5 11.5 - 14.5 %    Platelets 240 150 - 400 K/uL    MPV 9.9 8.9 - 12.9 FL    Neutrophils % 56.0 32.0 - 75.0 %    Lymphocytes % 35.5 12.0 - 49.0 %    Monocytes % 6.9 5.0 - 13.0 %    Eosinophils % 1.0 0.0 - 7.0 %    Basophils % 0.4 0.0 - 1.0 %    Immature Granulocytes % 0.2 0.0 - 0.5 %    Neutrophils Absolute 5.06 1.80 - 8.00 K/UL    Lymphocytes Absolute 3.21 0.80 - 3.50 K/UL    Monocytes Absolute 0.62 0.00 - 1.00 K/UL    Eosinophils Absolute 0.09 0.00 - 0.40 K/UL    Basophils Absolute 0.04 0.00 - 0.10 K/UL    Immature Granulocytes Absolute 0.02 0.00 - 0.04 K/UL    Differential Type AUTOMATED     Comprehensive Metabolic Panel    Collection Time: 07/14/25  7:00 AM   Result Value Ref Range    Sodium 137 136 - 145 mmol/L    Potassium Hemolyzed, Recollection Recommended 3.5 - 5.1 mmol/L    Chloride 102 97 - 108 mmol/L    CO2 26 21 - 32 mmol/L    Anion Gap 9 2 - 12 mmol/L    Glucose 326 (H) 65 - 100 mg/dL    BUN 12 6 - 20 mg/dL    Creatinine 0.48 (L) 0.55 - 1.02 mg/dL    BUN/Creatinine Ratio 25 (H) 12 - 20      Est, Glom Filt Rate >90

## 2025-07-14 NOTE — ED NOTES
Pts fluid bolus compete and clamped off. Pt currently sleeping in stretcher with side rails up x 2.

## 2025-07-14 NOTE — ED TRIAGE NOTES
Pt. Reports intermittent dizziness X3 days and is worse when she is sitting or laying down. Pt. Is a diabetic and reports her blood sugars are consistently in the 300s lately as she has not been on her meds for a few weeks due to not having insurance briefly. Pt. Denies numbness, tingling, weakness; PMS intact in all extremities. Pt. Denies SOB and chest pain. Pt. Ambulated with steady gait to room 4 from triage

## 2025-07-20 ENCOUNTER — APPOINTMENT (OUTPATIENT)
Facility: HOSPITAL | Age: 50
End: 2025-07-20
Payer: MEDICARE

## 2025-07-20 ENCOUNTER — HOSPITAL ENCOUNTER (EMERGENCY)
Facility: HOSPITAL | Age: 50
Discharge: HOME | End: 2025-07-20
Payer: MEDICARE

## 2025-07-20 VITALS
WEIGHT: 260 LBS | OXYGEN SATURATION: 98 % | DIASTOLIC BLOOD PRESSURE: 69 MMHG | HEART RATE: 85 BPM | RESPIRATION RATE: 18 BRPM | SYSTOLIC BLOOD PRESSURE: 121 MMHG | BODY MASS INDEX: 47.84 KG/M2 | HEIGHT: 62 IN | TEMPERATURE: 98.2 F

## 2025-07-20 DIAGNOSIS — R73.9 HYPERGLYCEMIA: ICD-10-CM

## 2025-07-20 DIAGNOSIS — K75.81 NASH (NONALCOHOLIC STEATOHEPATITIS): ICD-10-CM

## 2025-07-20 DIAGNOSIS — R19.7 DIARRHEA, UNSPECIFIED TYPE: Primary | ICD-10-CM

## 2025-07-20 DIAGNOSIS — K57.90 DIVERTICULOSIS: ICD-10-CM

## 2025-07-20 LAB
ALBUMIN SERPL-MCNC: 3.5 G/DL (ref 3.5–5)
ALBUMIN/GLOB SERPL: 0.9 (ref 1.1–2.2)
ALP SERPL-CCNC: 155 U/L (ref 45–117)
ALT SERPL-CCNC: 21 U/L (ref 12–78)
ANION GAP SERPL CALC-SCNC: 8 MMOL/L (ref 2–12)
APPEARANCE UR: CLEAR
AST SERPL W P-5'-P-CCNC: 9 U/L (ref 15–37)
BACTERIA URNS QL MICRO: ABNORMAL /HPF
BASOPHILS # BLD: 0.05 K/UL (ref 0–0.1)
BASOPHILS NFR BLD: 0.5 % (ref 0–1)
BILIRUB UR QL: NEGATIVE
BUN SERPL-MCNC: 12 MG/DL (ref 6–20)
BUN/CREAT SERPL: 16 (ref 12–20)
CA-I BLD-MCNC: 10.4 MG/DL (ref 8.5–10.1)
CHLORIDE SERPL-SCNC: 105 MMOL/L (ref 97–108)
CO2 SERPL-SCNC: 28 MMOL/L (ref 21–32)
COLOR UR: YELLOW
CREAT SERPL-MCNC: 0.73 MG/DL (ref 0.55–1.02)
DIFFERENTIAL METHOD BLD: ABNORMAL
EOSINOPHIL # BLD: 0.07 K/UL (ref 0–0.4)
EOSINOPHIL NFR BLD: 0.7 % (ref 0–7)
EPITH CASTS URNS QL MICRO: ABNORMAL /LPF
ERYTHROCYTE [DISTWIDTH] IN BLOOD BY AUTOMATED COUNT: 12.4 % (ref 11.5–14.5)
GLOBULIN SER CALC-MCNC: 3.8 G/DL (ref 2–4)
GLUCOSE SERPL-MCNC: 218 MG/DL (ref 65–100)
GLUCOSE UR STRIP.AUTO-MCNC: 100 MG/DL
HCT VFR BLD AUTO: 48.3 % (ref 35–47)
HGB BLD-MCNC: 15.6 G/DL (ref 11.5–16)
HGB UR QL STRIP: ABNORMAL
IMM GRANULOCYTES # BLD AUTO: 0.03 K/UL (ref 0–0.04)
IMM GRANULOCYTES NFR BLD AUTO: 0.3 % (ref 0–0.5)
KETONES UR QL STRIP.AUTO: NEGATIVE MG/DL
LEUKOCYTE ESTERASE UR QL STRIP.AUTO: ABNORMAL
LIPASE SERPL-CCNC: 37 U/L (ref 13–75)
LYMPHOCYTES # BLD: 2.83 K/UL (ref 0.8–3.5)
LYMPHOCYTES NFR BLD: 27 % (ref 12–49)
MAGNESIUM SERPL-MCNC: 1.7 MG/DL (ref 1.6–2.4)
MCH RBC QN AUTO: 30.2 PG (ref 26–34)
MCHC RBC AUTO-ENTMCNC: 32.3 G/DL (ref 30–36.5)
MCV RBC AUTO: 93.6 FL (ref 80–99)
MONOCYTES # BLD: 0.59 K/UL (ref 0–1)
MONOCYTES NFR BLD: 5.6 % (ref 5–13)
NEUTS SEG # BLD: 6.91 K/UL (ref 1.8–8)
NEUTS SEG NFR BLD: 65.9 % (ref 32–75)
NITRITE UR QL STRIP.AUTO: NEGATIVE
PH UR STRIP: 7 (ref 5–8)
PLATELET # BLD AUTO: 267 K/UL (ref 150–400)
PMV BLD AUTO: 9.6 FL (ref 8.9–12.9)
POTASSIUM SERPL-SCNC: 4.5 MMOL/L (ref 3.5–5.1)
PROT SERPL-MCNC: 7.3 G/DL (ref 6.4–8.2)
PROT UR STRIP-MCNC: NEGATIVE MG/DL
RBC # BLD AUTO: 5.16 M/UL (ref 3.8–5.2)
RBC #/AREA URNS HPF: ABNORMAL /HPF (ref 0–5)
SODIUM SERPL-SCNC: 141 MMOL/L (ref 136–145)
SP GR UR REFRACTOMETRY: 1.01 (ref 1–1.03)
URINE CULTURE IF INDICATED: ABNORMAL
UROBILINOGEN UR QL STRIP.AUTO: 0.1 EU/DL (ref 0.2–1)
WBC # BLD AUTO: 10.5 K/UL (ref 3.6–11)
WBC URNS QL MICRO: ABNORMAL /HPF (ref 0–4)

## 2025-07-20 PROCEDURE — 36415 COLL VENOUS BLD VENIPUNCTURE: CPT

## 2025-07-20 PROCEDURE — 81001 URINALYSIS AUTO W/SCOPE: CPT

## 2025-07-20 PROCEDURE — 85025 COMPLETE CBC W/AUTO DIFF WBC: CPT

## 2025-07-20 PROCEDURE — 74177 CT ABD & PELVIS W/CONTRAST: CPT

## 2025-07-20 PROCEDURE — 96360 HYDRATION IV INFUSION INIT: CPT

## 2025-07-20 PROCEDURE — 83690 ASSAY OF LIPASE: CPT

## 2025-07-20 PROCEDURE — 80053 COMPREHEN METABOLIC PANEL: CPT

## 2025-07-20 PROCEDURE — 83735 ASSAY OF MAGNESIUM: CPT

## 2025-07-20 PROCEDURE — 2580000003 HC RX 258: Performed by: PHYSICIAN ASSISTANT

## 2025-07-20 PROCEDURE — 99285 EMERGENCY DEPT VISIT HI MDM: CPT

## 2025-07-20 PROCEDURE — 6360000004 HC RX CONTRAST MEDICATION: Performed by: PHYSICIAN ASSISTANT

## 2025-07-20 RX ORDER — DICYCLOMINE HCL 20 MG
20 TABLET ORAL EVERY 6 HOURS
Qty: 40 TABLET | Refills: 0 | Status: SHIPPED | OUTPATIENT
Start: 2025-07-20

## 2025-07-20 RX ORDER — IOPAMIDOL 755 MG/ML
100 INJECTION, SOLUTION INTRAVASCULAR ONCE
Status: COMPLETED | OUTPATIENT
Start: 2025-07-20 | End: 2025-07-20

## 2025-07-20 RX ORDER — DIPHENOXYLATE HYDROCHLORIDE AND ATROPINE SULFATE 2.5; .025 MG/1; MG/1
1 TABLET ORAL 4 TIMES DAILY PRN
Qty: 12 TABLET | Refills: 0 | Status: SHIPPED | OUTPATIENT
Start: 2025-07-20 | End: 2025-07-23

## 2025-07-20 RX ORDER — METOCLOPRAMIDE 5 MG/1
5 TABLET ORAL 4 TIMES DAILY
Qty: 15 TABLET | Refills: 0 | Status: SHIPPED | OUTPATIENT
Start: 2025-07-20

## 2025-07-20 RX ORDER — 0.9 % SODIUM CHLORIDE 0.9 %
1000 INTRAVENOUS SOLUTION INTRAVENOUS
Status: COMPLETED | OUTPATIENT
Start: 2025-07-20 | End: 2025-07-20

## 2025-07-20 RX ADMIN — IOPAMIDOL 100 ML: 755 INJECTION, SOLUTION INTRAVENOUS at 16:27

## 2025-07-20 RX ADMIN — SODIUM CHLORIDE 1000 ML: 0.9 INJECTION, SOLUTION INTRAVENOUS at 15:44

## 2025-07-20 ASSESSMENT — PAIN - FUNCTIONAL ASSESSMENT: PAIN_FUNCTIONAL_ASSESSMENT: NONE - DENIES PAIN

## 2025-07-20 NOTE — ED TRIAGE NOTES
States since last night she has had diarrhea about 13 times, states her nephew had it as well. States she has taken imodium with no relief.

## 2025-07-20 NOTE — ED PROVIDER NOTES
[facility]  [address]  [place], [place] [address] [phone]    If symptoms worsen        DISCHARGE MEDICATIONS:     Medication List        START taking these medications      dicyclomine 20 MG tablet  Commonly known as: BENTYL  Take 1 tablet by mouth every 6 hours     diphenoxylate-atropine 2.5-0.025 MG per tablet  Commonly known as: LOMOTIL  Take 1 tablet by mouth 4 times daily as needed for Diarrhea for up to 3 days. Max Daily Amount: 4 tablets     metoclopramide 5 MG tablet  Commonly known as: REGLAN  Take 1 tablet by mouth 4 times daily     psyllium 28.3 % Powd powder  Commonly known as: KONSYL  Take 3.4 g by mouth daily            CONTINUE taking these medications      FreeStyle Hayden 2 Thrall Debora  Use to check BG 4 times daily. Dx code: E11.65     FreeStyle Hayden 2 Sensor Misc  Use to check BG 4 times daily. Dx code: E11.65     Lancets Misc            ASK your doctor about these medications      * albuterol sulfate  (90 Base) MCG/ACT inhaler  Commonly known as: PROVENTIL;VENTOLIN;PROAIR     * albuterol sulfate  (90 Base) MCG/ACT inhaler  Commonly known as: Proventil HFA  Inhale 2 puffs into the lungs every 4 hours as needed for Wheezing     ammonium lactate 12 % cream  Commonly known as: AMLACTIN     ARIPiprazole 10 MG tablet  Commonly known as: ABILIFY     azithromycin 250 MG tablet  Commonly known as: ZITHROMAX     buPROPion 100 MG tablet  Commonly known as: WELLBUTRIN     busPIRone 7.5 MG tablet  Commonly known as: BUSPAR     clotrimazole 1 % cream  Commonly known as: LOTRIMIN     dexlansoprazole 30 MG Cpdr delayed release capsule  Commonly known as: DEXILANT     dextromethorphan-guaiFENesin  MG per extended release tablet  Commonly known as: MUCINEX DM     diphenhydrAMINE 25 MG capsule  Commonly known as: Benadryl Allergy  Take 1 capsule by mouth every 6 hours as needed for Itching     Effexor XR 75 MG extended release capsule  Generic drug: